# Patient Record
Sex: FEMALE | Race: BLACK OR AFRICAN AMERICAN
[De-identification: names, ages, dates, MRNs, and addresses within clinical notes are randomized per-mention and may not be internally consistent; named-entity substitution may affect disease eponyms.]

---

## 2019-07-15 ENCOUNTER — HOSPITAL ENCOUNTER (EMERGENCY)
Dept: HOSPITAL 88 - ER | Age: 69
Discharge: HOME | End: 2019-07-15
Payer: MEDICARE

## 2019-07-15 VITALS — WEIGHT: 206 LBS | BODY MASS INDEX: 37.91 KG/M2 | HEIGHT: 62 IN

## 2019-07-15 VITALS — SYSTOLIC BLOOD PRESSURE: 152 MMHG | DIASTOLIC BLOOD PRESSURE: 76 MMHG

## 2019-07-15 DIAGNOSIS — I87.2: ICD-10-CM

## 2019-07-15 DIAGNOSIS — M79.604: ICD-10-CM

## 2019-07-15 DIAGNOSIS — R60.9: ICD-10-CM

## 2019-07-15 DIAGNOSIS — M79.605: Primary | ICD-10-CM

## 2019-07-15 DIAGNOSIS — I10: ICD-10-CM

## 2019-07-15 PROCEDURE — 93970 EXTREMITY STUDY: CPT

## 2019-07-15 PROCEDURE — 99283 EMERGENCY DEPT VISIT LOW MDM: CPT

## 2019-07-15 NOTE — XMS REPORT
Patient Summary Document

                             Created on: 07/15/2019



JORGITO MEYERS

External Reference #: [id number]

: 1950

Sex: Female



Demographics







                          Address                   [address]

[place]  [address]

 

                          Home Phone                [phone]

 

                          Preferred Language        Unknown

 

                          Marital Status            Unknown

 

                          Anabaptism Affiliation     Unknown

 

                          Race                      Unknown

 

                          Ethnic Group              Unknown





Author







                          Author                    [facility]

 

                          [facility]

 

                          Address                   Unknown

 

                          Phone                     Unavailable







Care Team Providers







                    Care Team Member Name    Role                Phone

 

                    COSME FORTUNE    Unavailable         Unavailable

 

                    DORYSROBERT    Unavailable         Unavailable







Problems

This patient has no known problems.



Allergies, Adverse Reactions, Alerts

This patient has no known allergies or adverse reactions.



Medications

This patient has no known medications.



Results







           Test Description    Test Time    Test Comments    Text Results    Atomic Results    Result

 Comments

 

                    FL, RAD TECH IN OR/30 MINUTE INCREMENTS    2018 15:12:00    Reason for exam:->Bilateral

 L5-S1 transforaminal epidural steroid injection    FINAL REPORT PATIENT ID:   [id number]

 Fluoroscopic spot imaging was performed at the time of the procedure by the 
ordering service. This examination is nondiagnostic. Fluoroscopy was not 
performed by the undersigned, and the radiologist was not present at the time of
examination. Interpretation of the images was not requested. Total fluoroscopy 
time:  28.7 seconds Total number of films: 2 Please refer to the referring 
physician's procedure report for complete detail. Signed: Faiza Ruiz 
Verified Date/Time:  2018 15:12:50 Reading Location: [address] 
Radiology Reading Room      Electronically signed by: FAIZA RUIZ M.D. on 
2018 03:12 PM                      

 

                    RAD, CHEST, 1 VIEW, NON DEPT    2018 14:54:00    Reason for exam:->LEG SWELLING

                                        FINAL REPORT PATIENT ID:   [id number] EXAM: Frontal chest radiograph HISTORY PROVIDED:

 Leg swelling COMPARISON: 2018 IMPRESSION:The lungs are clear without focal 
consolidation. There may be a trace left pleural effusion given minimal blunting
of the left costophrenic angle. No discernible pneumothorax. The 
cardiomediastinal silhouette is within normal limits. The thoracic aorta is 
tortuous. No acute osseous abnormality. Signed: Cole Castellon 
Verified Date/Time:  2018 14:54:03 Reading Location: [facility] Mammo Reading 
Room      Electronically signed by: COLE CASTELLON on 2018 02:54 PM     

 

 

                TROPONIN I      2018 14:45:00                      

 

   

 

                TROPONIN I (BEAKER) (test code=397)    < ng/mL         0.00-0.03        





Troponin I (TnI) levels must be interpreted in the context of the presenting sym
ptoms and the clinical findings. Elevated TnI levels indicate myocardial damage,
but are not specific for ischemic heart disease. Elevated TnI levels are seen in
patients with other cardiac conditions (including myocarditis and congestive h
eart failure), and slight TnI elevations occur in patients with other conditions
, including sepsis, renal failure, acidosis, acute neurological disease, and per
sistent tachyarrhythmia.B-TYPE NATRIURETIC FACTOR (BNP)2018 14:45:00* 





                Test Item       Value           Reference Range    Comments

 

                B-TYPE NATRIURETIC PEPTIDE (BEAKER) (test code=700)    105 pg/mL       0-100            





[id number] 14:37:00* 





                Test Item       Value           Reference Range    Comments

 

                MAGNESIUM (BEAKER) (test code=627)    2.2 mg/dL       1.6-2.6          





BASIC METABOLIC [id number] 14:37:00* 





                Test Item       Value           Reference Range    Comments

 

                SODIUM (BEAKER) (test code=381)    142 meq/L       136-145          

 

                POTASSIUM (BEAKER) (test code=379)    3.9 meq/L       3.5-5.1          

 

                CHLORIDE (BEAKER) (test code=382)    107 meq/L                  

 

                CO2 (BEAKER) (test code=355)    27 meq/L        22-29            

 

                BLOOD UREA NITROGEN (BEAKER) (test code=354)    13 mg/dL        7-21             

 

                CREATININE (BEAKER) (test code=358)    0.85 mg/dL      0.57-1.25        

 

                GLUCOSE RANDOM (BEAKER) (test code=652)    88 mg/dL                   

 

                CALCIUM (BEAKER) (test code=697)    9.9 mg/dL       8.4-10.2         

 

                EGFR (BEAKER) (test code=1092)    81 mL/min/1.73 sq m                    ESTIMATED GFR IS NOT AS 

ACCURATE AS CREATININE CLEARANCE IN PREDICTING GLOMERULAR FILTRATION RATE. 
ESTIMATED GFR IS NOT APPLICABLE FOR DIALYSIS PATIENTS.





[id number] 14:34:00* 





                Test Item       Value           Reference Range    Comments

 

                PROTIME (BEAKER) (test code=759)    13.9 seconds    11.7-14.7        

 

                INR (BEAKER) (test code=370)    1.1             <=5.9            

 

                PARTIAL THROMBOPLASTIN TIME (BEAKER) (test code=760)    30.9 seconds    22.5-36.0        







RECOMMENDED COUMADIN/WARFARIN INR THERAPY RANGESSTANDARD DOSE: 2.0 - 3.0   Inclu
bita: PROPHYLAXIS for venous thrombosis, systemic embolization; TREATMENT for rj
ous thrombosis and/or pulmonary embolus.HIGH RISK: Target INR is 2.5-3.5 for pat
ients with mechanical heart valves.CBC W/PLT COUNT & AUTO [id number]
14:23:00* 





                Test Item       Value           Reference Range    Comments

 

                WHITE BLOOD CELL COUNT (BEAKER) (test code=775)    6.9 K/ L        3.5-10.5         

 

                RED BLOOD CELL COUNT (BEAKER) (test code=761)    4.29 M/ L       3.93-5.22        

 

                HEMOGLOBIN (BEAKER) (test code=410)    11.8 GM/DL      11.2-15.7        

 

                HEMATOCRIT (BEAKER) (test code=411)    38.1 %          34.1-44.9        

 

                MEAN CORPUSCULAR VOLUME (BEAKER) (test code=753)    88.8 fL         79.4-94.8        

 

                MEAN CORPUSCULAR HEMOGLOBIN (BEAKER) (test code=751)    27.5 pg         25.6-32.2        

 

                    MEAN CORPUSCULAR HEMOGLOBIN CONC (BEAKER) (test code=752)    31.0 GM/DL          32.2-35.5

                                         

 

                RED CELL DISTRIBUTION WIDTH (BEAKER) (test code=412)    13.3 %          11.7-14.4        

 

                PLATELET COUNT (BEAKER) (test code=756)    281 K/CU MM     150-450          

 

                MEAN PLATELET VOLUME (BEAKER) (test code=754)    10.0 fL         9.4-12.3         

 

                NUCLEATED RED BLOOD CELLS (BEAKER) (test code=413)    0 /100 WBC      0-0              

 

                NEUTROPHILS RELATIVE PERCENT (BEAKER) (test code=429)    57 %                             

 

                LYMPHOCYTES RELATIVE PERCENT (BEAKER) (test code=430)    28 %                             

 

                MONOCYTES RELATIVE PERCENT (BEAKER) (test code=431)    13 %                             

 

                EOSINOPHILS RELATIVE PERCENT (BEAKER) (test code=432)    2 %                              

 

                BASOPHILS RELATIVE PERCENT (BEAKER) (test code=437)    1 %                              

 

                NEUTROPHILS ABSOLUTE COUNT (BEAKER) (test code=670)    3.90 K/ L       1.56-6.13        

 

                LYMPHOCYTES ABSOLUTE COUNT (BEAKER) (test code=414)    1.90 K/ L       1.18-3.74        

 

                MONOCYTES ABSOLUTE COUNT (BEAKER) (test code=415)    0.88 K/ L       0.24-0.36        

 

                EOSINOPHILS ABSOLUTE COUNT (BEAKER) (test code=416)    0.14 K/ L       0.04-0.36        

 

                BASOPHILS ABSOLUTE COUNT (BEAKER) (test code=417)    0.07 K/ L       0.01-0.08        

 

                IMMATURE GRANULOCYTES-RELATIVE PERCENT (BEAKER) (test code=2801)    0 %             0-1              





MR, BRAIN WITH IV [id number] 14:05:00FINAL REPORT PATIENT ID:   [id number]
 MRI brain and orbits with and without contrast Comparison:      CTA 2018, MRI September 10 Reason for exam: Multiple sclerosis Discussion: T1 
precontrast and multiplanar T1 postcontrast MR imaging of the brain was 
provided. Reconstructed postcontrast renderings were provided. Dedicated axial 
and coronal pre and postcontrast orbital imaging was performed as well using T1 
and T2 sequences with appropriate use of fat saturation. Postcontrast imaging is
motion degraded. The study is therefore limited for detecting subtle enhancing 
foci. No grossly apparent abnormal cerebral enhancement is identified. Orbital 
imaging revealed no convincing abnormality. Prominent superior ophthalmic veins 
are thought to be a normal variant. The globes, optic nerve/sheath complexes, 
intraconal regions, extraocular musculature, extraconal regions, cavernous 
sinuses, optic chiasm, and parasellar regions are within normal limits. 
Impressions: 1. Motion degraded postcontrast brain evaluation albeit with no 
grossly apparent pathologic enhancement. Consider follow-up imaging as clin
ically warranted.   2. Unremarkable orbits. Signed: [facility]
 Date/Time:  2018 14:05:18 Reading Location: [facility] C013 Neuro Reading 
Room      Electronically signed by: CARLOS POTTER M.D. on 2018 02:05 PM 
MR, ORBIT, FACE, NECK, [id number] 14:05:00Reason for exam:->MS? OPTIC 
NEURITISFINAL REPORT PATIENT ID:   [id number]  MRI brain and orbits with and 
without contrast Comparison:      CTA 2018, MRI September 10 
Reason for exam: Multiple sclerosis Discussion: T1 precontrast and multiplanar 
T1 postcontrast MR imaging of the brain was provided. Reconstructed postcontrast
renderings were provided. Dedicated axial and coronal pre and postcontrast 
orbital imaging was performed as well using T1 and T2 sequences with appropriate
use of fat saturation. Postcontrast imaging is motion degraded. The study is 
therefore limited for detecting subtle enhancing foci. No grossly apparent 
abnormal cerebral enhancement is identified. Orbital imaging revealed no 
convincing abnormality. Prominent superior ophthalmic veins are thought to be a 
normal variant. The globes, optic nerve/sheath complexes, intraconal regions, 
extraocular musculature, extraconal regions, cavernous sinuses, optic chiasm, 
and parasellar regions are within normal limits. Impressions: 1. Motion degraded
postcontrast brain evaluation albeit with no grossly apparent pathologic 
enhancement. Consider follow-up imaging as clinically warranted.   2. 
Unremarkable orbits. Signed: Carlos Potter Verified Date/Time:  
2018 14:05:18 Reading Location: [facility] C0[facility] Neuro Reading Room      
Electronically signed by: CARLOS POTTER M.D. on 2018 02:05 PM MR, BRAIN, 
WITHOUT CONTRAST2018-09-10 17:48:00FINAL REPORT PATIENT ID:   [id number] MRI Brain
without contrast Clinical History: encephalopathy Technique: MRI of the brain 
utilizing axial T2, FLAIR, GRE, DWI; sagittal and coronal T1-weighted images. 
Comparisons: CT 2018 Findings: There is no evidence of acute infarct or 
hemorrhage. There is mild to moderate periventricular and subcortical white 
matter T2 hyperintensity, which is nonspecific but compatible with chronic 
microvascular ischemic change. There is mild generalized parenchymal volume loss
without hydrocephalus, midline shift, or apparent mass effect. There are no 
extra-axial fluid collections. The craniocervical junction is preserved. The 
major intracranial flow-voids appear patent.  IMPRESSION: No evidence of acute 
infarct, hemorrhage, or hydrocephalus. Chronic microvascular ischemic disease 
with generalized parenchymal volume loss. Signed: Choco James 
Verified Date/Time:  09/10/2018 17:48:49 Reading Location: [facility] 
Radiology Reading Room      Electronically signed by: CHOCO JAMES M.D. on 
09/10/2018 05:48 PM NM, PARATHYROID IMAGING WITH TOMOGRAPHIC SPECT2018-09-10 
16:44:00Reason for exam:->primary hyperparathyroidismFINAL REPORT PATIENT ID:   
[id number]  PROCEDURE:     PARATHYROID SCAN, with SPECT CPT CODE:        80190 
INDICATION:      History of primary hyperthyroidism, recent admission for 
uncontrollable hypertension PROTOCOL:      25.3 mCi of Tc-99m sestamibi was 
injected intravenously.  Planar imaging of the head/neck and chest was performed
shortly after tracer injection and was repeated approximately 3 hours later. 
Limited SPECT images were obtained for tracer localization. FINDINGS:         
Early images show tracer uptake in the salivary glands, the thyroid gland, and 
the heart. Delayed images show substantial washout of tracer from the thyroid 
gland. No focal abnormality is noted in the thyroid bed or mediastinum. I
MPRESSION:    Normal radionuclide parathyroid scan.  There is no focal accumulat
ion to indicate parathyroid adenoma. Signed: Apolinar Koroma MDReport Verified Rachid
e/Time:  09/10/2018 16:44:43 Reading Location: [address]    Electronically signed by: APOLINAR KOROMA MD on 09/10/2018 04:44 
PM CALCIUM, 24 HOUR URINE2018-09-10 14:33:00* 





                Test Item       Value           Reference Range    Comments

 

                VOLUME, TOTAL (BEAKER) (test code=1457)    2550 ml                          

 

                CALCIUM URINE (BEAKER) (test code=396)    < mg/dL                          

 

                CALCIUM, 24HR URINE (BEAKER) (test code=1520)    < mg/24 Hr                 





BASIC METABOLIC PANEL2018-09-10 06:49:00* 





                Test Item       Value           Reference Range    Comments

 

                SODIUM (BEAKER) (test code=381)    141 meq/L       135-148          

 

                POTASSIUM (BEAKER) (test code=379)    3.8 meq/L       3.6-5.5          

 

                CHLORIDE (BEAKER) (test code=382)    111 meq/L                  

 

                CO2 (BEAKER) (test code=355)    24 meq/L        20-29            

 

                BLOOD UREA NITROGEN (BEAKER) (test code=354)    14 mg/dL        10-26            

 

                CREATININE (BEAKER) (test code=358)    0.78 mg/dL      0.50-1.20        

 

                GLUCOSE RANDOM (BEAKER) (test code=652)    87 mg/dL                   

 

                CALCIUM (BEAKER) (test code=697)    8.7 mg/dL       8.5-10.5         

 

                EGFR (BEAKER) (test code=1092)    89 mL/min/1.73 sq m                    ESTIMATED GFR IS NOT AS 

ACCURATE AS CREATININE CLEARANCE IN PREDICTING GLOMERULAR FILTRATION RATE. 
ESTIMATED GFR IS NOT APPLICABLE FOR DIALYSIS PATIENTS.





VITAMIN D, [id number] 13:19:00* 





                Test Item       Value           Reference Range    Comments

 

                VITAMIN D 25-OH (BEAKER) (test code=2764)    13.0 ng/mL      6.6-49.9         





Effective 10/11/2017: Reference Range ChangeNew: 6.6-49.9 ng/mL   Previous: 13.0
-47.8 ng/mLRecommended Vitamin D Target Range: 30.0-40.0 ng/mLTROPONIN I
2018 09:11:00* 





                Test Item       Value           Reference Range    Comments

 

                TROPONIN I (BEAKER) (test code=397)    < ng/mL         0.00-0.15        





Troponin I (TnI) levels must be interpreted in the context of the presenting sym
ptoms and the clinical findings. Elevated TnI levels indicate myocardial damage,
but are not specific for ischemic heart disease. Elevated TnI levels are seen in
patients with other cardiac conditions (including myocarditis and congestive h
eart failure), and slight TnI elevations occur in patients with other conditions
, including sepsis, renal failure, acidosis, acute neurological disease, and per
sistent tachyarrhythmia.COMPREHENSIVE METABOLIC [id number] 06:28:00* 





                Test Item       Value           Reference Range    Comments

 

                TOTAL PROTEIN (BEAKER) (test code=770)    7.4 gm/dL       6.0-8.5          

 

                ALBUMIN (BEAKER) (test code=1145)    4.0 g/dL        3.5-5.0          

 

                ALKALINE PHOSPHATASE (BEAKER) (test code=346)    68 U/L                     

 

                BILIRUBIN TOTAL (BEAKER) (test code=377)    0.5 mg/dL       0.1-1.2          

 

                SODIUM (BEAKER) (test code=381)    140 meq/L       135-148          

 

                POTASSIUM (BEAKER) (test code=379)    3.2 meq/L       3.6-5.5          

 

                CHLORIDE (BEAKER) (test code=382)    103 meq/L                  

 

                CO2 (BEAKER) (test code=355)    28 meq/L        20-29            

 

                BLOOD UREA NITROGEN (BEAKER) (test code=354)    13 mg/dL        10-26            

 

                CREATININE (BEAKER) (test code=358)    1.05 mg/dL      0.50-1.20        

 

                GLUCOSE RANDOM (BEAKER) (test code=652)    71 mg/dL                   

 

                CALCIUM (BEAKER) (test code=697)    9.3 mg/dL       8.5-10.5         

 

                AST (SGOT) (BEAKER) (test code=353)    16 U/L          5-40             

 

                ALT (SGPT) (BEAKER) (test code=347)    11 U/L          5-50             

 

                EGFR (BEAKER) (test code=1092)    63 mL/min/1.73 sq m                    ESTIMATED GFR IS NOT AS 

ACCURATE AS CREATININE CLEARANCE IN PREDICTING GLOMERULAR FILTRATION RATE. 
ESTIMATED GFR IS NOT APPLICABLE FOR DIALYSIS PATIENTS.





CALCIUM, [id number] 04:59:00* 





                Test Item       Value           Reference Range    Comments

 

                CALCIUM IONIZED (BEAKER) (test code=698)    1.11 mmol/L     1.12-1.27        

 

                PH, BLOOD (BEAKER) (test code=1810)    7.31                             





TROPONIN -39-73 02:03:00* 





                Test Item       Value           Reference Range    Comments

 

                TROPONIN I (BEAKER) (test code=397)    < ng/mL         0.00-0.15        





Troponin I (TnI) levels must be interpreted in the context of the presenting sym
ptoms and the clinical findings. Elevated TnI levels indicate myocardial damage,
but are not specific for ischemic heart disease. Elevated TnI levels are seen in
patients with other cardiac conditions (including myocarditis and congestive h
eart failure), and slight TnI elevations occur in patients with other conditions
, including sepsis, renal failure, acidosis, acute neurological disease, and per
sistent tachyarrhythmia.TSH/FREE T4 IF [id number] 18:04:00* 





                Test Item       Value           Reference Range    Comments

 

                THYROID STIMULATING HORMONE (BEAKER) (test code=772)    1.65 uIU/mL     0.35-5.50        





PTH, [id number] 18:01:00* 





                Test Item       Value           Reference Range    Comments

 

                PARATHYROID HORMONE INTACT (BEAKER) (test code=577)    94.3 pg/mL      15.0-90.0        





TROPONIN -09-67 08:34:00* 





                Test Item       Value           Reference Range    Comments

 

                TROPONIN I (BEAKER) (test code=397)    < ng/mL         0.00-0.15        





Troponin I (TnI) levels must be interpreted in the context of the presenting sym
ptoms and the clinical findings. Elevated TnI levels indicate myocardial damage,
but are not specific for ischemic heart disease. Elevated TnI levels are seen in
patients with other cardiac conditions (including myocarditis and congestive h
eart failure), and slight TnI elevations occur in patients with other conditions
, including sepsis, renal failure, acidosis, acute neurological disease, and per
sistent tachyarrhythmia.BASIC METABOLIC [id number] 06:15:00* 





                Test Item       Value           Reference Range    Comments

 

                SODIUM (BEAKER) (test code=381)    140 meq/L       135-148          

 

                POTASSIUM (BEAKER) (test code=379)    3.7 meq/L       3.6-5.5          

 

                CHLORIDE (BEAKER) (test code=382)    109 meq/L                  

 

                CO2 (BEAKER) (test code=355)    23 meq/L        20-29            

 

                BLOOD UREA NITROGEN (BEAKER) (test code=354)    10 mg/dL        10-26            

 

                CREATININE (BEAKER) (test code=358)    0.78 mg/dL      0.50-1.20        

 

                GLUCOSE RANDOM (BEAKER) (test code=652)    95 mg/dL                   

 

                CALCIUM (BEAKER) (test code=697)    11.2 mg/dL      8.5-10.5         

 

                EGFR (BEAKER) (test code=1092)    89 mL/min/1.73 sq m                    ESTIMATED GFR IS NOT AS 

ACCURATE AS CREATININE CLEARANCE IN PREDICTING GLOMERULAR FILTRATION RATE. 
ESTIMATED GFR IS NOT APPLICABLE FOR DIALYSIS PATIENTS.





LIPID [id number] 04:42:00* 





                Test Item       Value           Reference Range    Comments

 

                TRIGLYCERIDES (BEAKER) (test code=540)    53 mg/dL                        Specimen markedly hemolyzed



 

                CHOLESTEROL (BEAKER) (test code=631)    156 mg/dL                       Specimen markedly hemolyzed

 

                HDL CHOLESTEROL (BEAKER) (test code=976)    61 mg/dL                         

 

                LDL CHOLESTEROL CALCULATED (BEAKER) (test code=633)    84 mg/dL                         





Triglyceride Reference Range:   Low Risk         <150   Borderline    150-199   
High Risk     200-499   Very High Risk  >=500Cholesterol Reference Range:   Low 
Risk         <200   Borderline    200-239    High Risk        >240HDL 
Cholesterol Reference Range:   Low Risk         >=60   High Risk         <40LDL 
Cholesterol Reference Range:   Optimal          <100   Near Optimal  100-129   
Borderline    130-159   High          160-189   Very High       >=190   
HEMOGLOBIN [id number] 04:24:00* 





                Test Item       Value           Reference Range    Comments

 

                HEMOGLOBIN A1C (BEAKER) (test code=368)    5.4 %           4.3-6.1          





CBC W/PLT COUNT & AUTO [id number] 04:16:00* 





                Test Item       Value           Reference Range    Comments

 

                WHITE BLOOD CELL COUNT (BEAKER) (test code=775)    7.4 K/ L        4.0-10.0         

 

                RED BLOOD CELL COUNT (BEAKER) (test code=761)    4.28 M/ L       4.00-5.00        

 

                HEMOGLOBIN (BEAKER) (test code=410)    11.6 GM/DL      12.0-15.5        

 

                HEMATOCRIT (BEAKER) (test code=411)    38.8 %          36.0-46.0        

 

                MEAN CORPUSCULAR VOLUME (BEAKER) (test code=753)    90.7 fL         82.0-99.0        

 

                MEAN CORPUSCULAR HEMOGLOBIN (BEAKER) (test code=751)    27.1 pg         27.0-33.0        

 

                    MEAN CORPUSCULAR HEMOGLOBIN CONC (BEAKER) (test code=752)    29.9 GM/DL          32.0-36.0

                                         

 

                RED CELL DISTRIBUTION WIDTH (BEAKER) (test code=412)    14.0 %          12.0-15.0        

 

                PLATELET COUNT (BEAKER) (test code=756)    253 K/CU MM     150-430          

 

                MEAN PLATELET VOLUME (BEAKER) (test code=754)    10.1 fL         6.0-11.5         

 

                NUCLEATED RED BLOOD CELLS (BEAKER) (test code=413)    0 /100 WBC      0-0              

 

                NEUTROPHILS RELATIVE PERCENT (BEAKER) (test code=429)    59 %                             

 

                LYMPHOCYTES RELATIVE PERCENT (BEAKER) (test code=430)    27 %                             

 

                MONOCYTES RELATIVE PERCENT (BEAKER) (test code=431)    12 %                             

 

                EOSINOPHILS RELATIVE PERCENT (BEAKER) (test code=432)    1 %                              

 

                BASOPHILS RELATIVE PERCENT (BEAKER) (test code=437)    0 %                              

 

                NEUTROPHILS ABSOLUTE COUNT (BEAKER) (test code=670)    4.37 K/ L       1.80-8.00        

 

                LYMPHOCYTES ABSOLUTE COUNT (BEAKER) (test code=414)    2.04 K/ L       1.48-4.50        

 

                MONOCYTES ABSOLUTE COUNT (BEAKER) (test code=415)    0.91 K/ L       0.00-1.30        

 

                EOSINOPHILS ABSOLUTE COUNT (BEAKER) (test code=416)    0.07 K/ L       0.00-0.50        

 

                BASOPHILS ABSOLUTE COUNT (BEAKER) (test code=417)    0.03 K/ L       0.00-0.20        

 

                IMMATURE GRANULOCYTES-RELATIVE PERCENT (BEAKER) (test code=2801)    0 %             0-0              





CT, CTANGIO  [id number] 22:54:00FINAL REPORT PATIENT ID:   [id number] CT, 
CTANGIO  BRAINBRAIN CT WITHOUT CONTRAST INDICATION: headache, hypertension, 
dizzy COMPARISON: CT head of the same date TECHNIQUE:Rapid acquisition spiral 
images were obtained between the skull base and the cranial vertex during 
intravenous contrast infusion to reconstruct axial images and angiographic 3D 
maximum intensity projections (MIP). 3-D volumetric reformatted images were 
created at a dedicated workstation. Precontrast images of the brain were also 
obtained.  DOSE REDUCTION: Dose modulation, iterative reconstruction, and/or 
weight-based adjustment of the mA/kV was utilized to reduce the radiation dose 
to as low as reasonably achievable. FINDINGS:NECT BRAIN:Cerebral parenchyma: 
Patchy low attenuation noted in the parieto-occipital regions bilaterally as 
well as small focus in the left frontal pole. No disruption of the gray-white 
distinction. No hemorrhage.Midline structures: Normally positioned.Cerebellum 
and brainstem: Normal.Ventricles: Normal volume.Extra-axial spaces: Unrema
rkable.Calvarium and skull base: Intact.Paranasal sinuses and mastoid air cells:
Visible chambers are clear.Orbital contents: Included portions unremarkable. CTA
BRAIN:Internal carotid arteries: Petrous, cavernous and supraclinoid portions 
patent. Middle cerebral arteries: Patent to distal branches.Anterior cerebral ar
teries: Patent. Intact anterior communicating artery.Basilar system: Patent vert
ebrobasilar system.Posterior cerebral arteries:Patent beyond the quadrigeminal s
egments.Venous opacification: Major dural sinuses unremarkable for bolus timing.
Additional findings: None.  IMPRESSION: Parenchymal appearance suggests hyperten
sive (reversible) encephalopathy syndrome. Unremarkable CTA of the head. Signed:
JR Sandoval Robert MDReport Verified Date/Time:  2018 22:54:58 Read
ing Location: [facility] B1 C013Y CT Body Reading Room    Electronically signed by: JETHRO SANDOVAL on 2018 10:54 PM B-TYPE NATRIURETIC FACTOR (BNP)
2018 21:45:00* 





                Test Item       Value           Reference Range    Comments

 

                B-TYPE NATRIURETIC PEPTIDE (BEAKER) (test code=700)    27 pg/mL        0-100            





TROPONIN -[id number] 21:44:00* 





                Test Item       Value           Reference Range    Comments

 

                TROPONIN I (BEAKER) (test code=397)    < ng/mL         0.00-0.15        





Troponin I (TnI) levels must be interpreted in the context of the presenting sym
ptoms and the clinical findings. Elevated TnI levels indicate myocardial damage,
but are not specific for ischemic heart disease. Elevated TnI levels are seen in
patients with other cardiac conditions (including myocarditis and congestive h
eart failure), and slight TnI elevations occur in patients with other conditions
, including sepsis, renal failure, acidosis, acute neurological disease, and per
sistent tachyarrhythmia.BASIC METABOLIC [id number] 21:38:00* 





                Test Item       Value           Reference Range    Comments

 

                SODIUM (BEAKER) (test code=381)    139 meq/L       135-148          

 

                POTASSIUM (BEAKER) (test code=379)    5.4 meq/L       3.6-5.5         Specimen markedly hemolyzed



 

                CHLORIDE (BEAKER) (test code=382)    105 meq/L                  

 

                CO2 (BEAKER) (test code=355)    22 meq/L        20-29            

 

                BLOOD UREA NITROGEN (BEAKER) (test code=354)    13 mg/dL        10-26            

 

                CREATININE (BEAKER) (test code=358)    0.89 mg/dL      0.50-1.20       Specimen markedly hemolyzed



 

                GLUCOSE RANDOM (BEAKER) (test code=652)    96 mg/dL                   

 

                CALCIUM (BEAKER) (test code=697)    11.1 mg/dL      8.5-10.5         

 

                EGFR (BEAKER) (test code=1092)    77 mL/min/1.73 sq m                    ESTIMATED GFR IS NOT AS 

ACCURATE AS CREATININE CLEARANCE IN PREDICTING GLOMERULAR FILTRATION RATE. 
ESTIMATED GFR IS NOT APPLICABLE FOR DIALYSIS PATIENTS.





PT/[id number] 21:33:00* 





                Test Item       Value           Reference Range    Comments

 

                PROTIME (BEAKER) (test code=759)    10.3 sec        9.3-12.0         

 

                INR (BEAKER) (test code=370)    0.9             <=5.9            

 

                PARTIAL THROMBOPLASTIN TIME (BEAKER) (test code=760)    27.5 sec        23.0-35.0        





RECOMMENDED COUMADIN/WARFARIN INR THERAPY RANGESSTANDARD DOSE: 2.0 - 3.0   Inclu
bita: PROPHYLAXIS for venous thrombosis, systemic embolization; TREATMENT for rj
ous thrombosis and/or pulmonary embolus.HIGH RISK: Target INR is 2.5-3.5 for pat
ients with mechanical heart valves.Final Information (Auto Output)Final Informat
ion (Auto Output)Final Information (Auto Output)[id number] 21:31:00* 





                Test Item       Value           Reference Range    Comments

 

                MAGNESIUM (BEAKER) (test code=627)    3.4 mg/dL       1.5-3.0         Specimen markedly hemolyzed







RAD, CHEST, 1 VIEW, NON [id number] 21:30:00Reason for exam:->
HYPERTENSIONReason for exam:->CHEST PAINShould this be performed at the 
bedside?->YesFINAL REPORT PATIENT ID:   [id number]  History: Chest pain. 
Comparison: 2016 Findings: A single view of the chest is submitted. The 
cardiomediastinal contours are unremarkable.  There is no focal consolidation, 
pneumothorax, large pleural effusion or evidence of overt pulmonary edema.   
There is no acute bony abnormality. Impression: No acute abnormality. Signed: 
Sukhi Moss MDReport Verified Date/Time:  2018 21:30:58 Reading 
Location: [address] Reading Room      Electronically signed by: 
SUKHI MOSS M.D. on 2018 09:30 PM CBC W/PLT COUNT & AUTO DIFFERENTIAL
2018 21:06:00* 





                Test Item       Value           Reference Range    Comments

 

                WHITE BLOOD CELL COUNT (BEAKER) (test code=775)    8.6 K/ L        4.0-10.0         

 

                RED BLOOD CELL COUNT (BEAKER) (test code=761)    4.68 M/ L       4.00-5.00        

 

                HEMOGLOBIN (BEAKER) (test code=410)    12.8 GM/DL      12.0-15.5        

 

                HEMATOCRIT (BEAKER) (test code=411)    42.6 %          36.0-46.0        

 

                MEAN CORPUSCULAR VOLUME (BEAKER) (test code=753)    91.0 fL         82.0-99.0        

 

                MEAN CORPUSCULAR HEMOGLOBIN (BEAKER) (test code=751)    27.4 pg         27.0-33.0        

 

                    MEAN CORPUSCULAR HEMOGLOBIN CONC (BEAKER) (test code=752)    30.0 GM/DL          32.0-36.0

                                         

 

                RED CELL DISTRIBUTION WIDTH (BEAKER) (test code=412)    14.6 %          12.0-15.0        

 

                PLATELET COUNT (BEAKER) (test code=756)    283 K/CU MM     150-430          

 

                MEAN PLATELET VOLUME (BEAKER) (test code=754)    10.4 fL         6.0-11.5         

 

                NUCLEATED RED BLOOD CELLS (BEAKER) (test code=413)    0 /100 WBC      0-0              

 

                NEUTROPHILS RELATIVE PERCENT (BEAKER) (test code=429)    65 %                             

 

                LYMPHOCYTES RELATIVE PERCENT (BEAKER) (test code=430)    25 %                             

 

                MONOCYTES RELATIVE PERCENT (BEAKER) (test code=431)    9 %                              

 

                EOSINOPHILS RELATIVE PERCENT (BEAKER) (test code=432)    0 %                              

 

                BASOPHILS RELATIVE PERCENT (BEAKER) (test code=437)    1 %                              

 

                NEUTROPHILS ABSOLUTE COUNT (BEAKER) (test code=670)    5.53 K/ L       1.80-8.00        

 

                LYMPHOCYTES ABSOLUTE COUNT (BEAKER) (test code=414)    2.13 K/ L       1.48-4.50        

 

                MONOCYTES ABSOLUTE COUNT (BEAKER) (test code=415)    0.80 K/ L       0.00-1.30        

 

                EOSINOPHILS ABSOLUTE COUNT (BEAKER) (test code=416)    0.03 K/ L       0.00-0.50        

 

                BASOPHILS ABSOLUTE COUNT (BEAKER) (test code=417)    0.06 K/ L       0.00-0.20        

 

                IMMATURE GRANULOCYTES-RELATIVE PERCENT (BEAKER) (test code=2801)    0 %             0-0              





FL, RAD TECH IN OR/30 MINUTE [id number] 15:39:00Reason for exam:->
intrforamanal injectionFINAL REPORT PATIENT ID:   [id number] History: 
Intraforaminal injection COMPARISON: None DISCUSSION: A total of 2 
intraoperative images were submitted for interpretation. Neither was a 
radiologist present nor requested during the procedure. The findings should be 
correlated with intraprocedural observations. The images were presented for 
interpretation following completion of the examination. This is a nondiagnostic 
examination. The total fluoroscopy time was 43.2 seconds . A total of 2 static 
images were acquired. Signed: Aarti Hernandez Verified Date/Time:  
2018 15:39:02 Reading Location: [address] Radiology Reading Room      
Electronically signed by: AARTI HERNANDEZ M.D. on 2018 03:39 PM

## 2019-07-15 NOTE — XMS REPORT
Clinical Summary

                             Created on: 07/15/2019



Jorgito Cortez

External Reference #: [id number]

: 1950

Sex: Female



Demographics







                          Address                   [address] 

[place]  [address]

 

                          Home Phone                [phone]

 

                          Preferred Language        English

 

                          Marital Status            Unknown

 

                          Oriental orthodox Affiliation     Zoroastrian

 

                          Race                      Black or 

 

                          Ethnic Group              Non-





Author







                          Author                    [facility]

 

                          Organization              [facility]

 

                          Address                   Unknown

 

                          Phone                     Unavailable







Support







                Name            Relationship    Address         Phone

 

                    Eliel Singh       [employer]                [address] [place]  [address]                [phone]

 

                    Ponce Cortez Jr    EC[employer]                [address] [place]  [address]                [phone]







Care Team Providers







                    Care Team Member Name    Role                Phone

 

                    Allen Maki MD    PCP                 Unavailable







Allergies







                                        Comments



                 Active Allergy     Reactions       Severity        Noted Date 

 

                                         



                 Ciprofloxacin     Swelling        Low             10/07/2014 

 

                                         



                 Meperidine      Itching,        Low             10/07/2014 



                                         Swelling   

 

                                         



                 Erythromycin     Swelling        Low             2015 

 

                                        



Pt not aware of this allergy.



                           Erythropoietin Analogues       10/07/2014 

 

                                        



Fever.



                 Pneumococcal Vaccine     Swelling,       Medium          10/07/2014 



                                         Other (See   



                                         Comments)   

 

                                         



                 Butorphanol Tartrate     Itching,        Low             10/07/2014 



                                         Swelling   

 

                                         



                 Pentazocine Lactate     Itching,        Low             10/07/2014 



                                         Swelling   







Medications







                          End Date                  Status



              Medication     Sig          Dispensed     Refills      Start  



                                         Date  

 

                                                    Active



                     traMADol (ULTRAM) 50 mg     Take 50 mg by       0   



                           tablet                    mouth every 6     



                                         (six) hours     



                                         as needed for     



                                         Pain.     

 

                                                    Active



                     gabapentin (NEURONTIN)     Take 600 mg         0   



                           600 MG tablet             by mouth 4     



                                         (four) times     



                                         daily .     

 

                                                    Active



                     diazepam (VALIUM) 10 MG     Take 5 mg by        0   



                           tablet                    mouth as     



                                         needed for     



                                         Anxiety .     

 

                                                    Active



                     baclofen (LIORESAL) 10 MG     Take 10 mg by       0   



                           tablet                    mouth as     



                                         needed .     

 

                                                    Active



                     amitriptyline (ELAVIL)     Take 100 mg         0   



                           100 MG tablet             by mouth     



                                         every night     



                                         as needed for     



                                         Sleep.     

 

                                                    Active



                     loratadine (CLARITIN) 10     Take 10 mg by       0   



                           mg tablet                 mouth daily.     

 

                                                    Active



                     oxybutynin (DITROPAN-XL)     Take 5 mg by        0   



                           5 MG 24 hr tablet         mouth daily .     

 

                                                    Active



                     azelastine (ASTELIN) 137     2 sprays by         0   



                           mcg (0.1 %) nasal spray     Nasal route     



                                         daily Use in     



                                         each nostril     



                                         as directed .     

 

                                                    Active



                     budesonide-formoterol     Inhale 2            0   



                           (SYMBICORT) 80-4.5        puffs by     



                           mcg/actuation inhaler     mouth via     



                                         inhaler as     



                                         needed .     

 

                                                    Active



                     levalbuterol (XOPENEX     Inhale 1 puff       0   



                           HFA) 45 mcg/actuation     by mouth via     



                           inhaler                   inhaler as     



                                         needed for     



                                         Wheezing .     

 

                                                    Active



                     SUMAtriptan (IMITREX) 25     Take 25 mg by       0   



                           MG tablet                 mouth every 2     



                                         (two) hours     



                                         as needed for     



                                         Migraine.     

 

                                                    Active



                     pantoprazole (PROTONIX)     Take 40 mg by       0   



                           40 MG tablet              mouth daily.     

 

                                                    Active



                     interferon beta-1a     Inject              0   



                           (AVONEX) 30 mcg/0.5 mL     intramuscular     



                           PnIj                      ly once a     



                                         week.     

 

                                                    Active



                     aspirin 81 MG EC tablet     Take 81 mg by       0   



                                         mouth daily.     

 

                                                    Active



                     ipratropium (ATROVENT)     Take 500 mcg        0   



                           0.02 % nebulizer solution     by     



                                         nebulization     



                                         as needed for     



                                         Wheezing.     

 

                                                    Active



                     carvedilol (COREG) 12.5     Take 12.5 mg        0   



                           MG tablet                 by mouth 2     



                                         (two) times     



                                         daily with     



                                         breakfast and     



                                         dinner.     

 

                                                    Active



                     losartan-hydroCHLOROthiaz     Take 1 tablet       0   



                           goyo (HYZAAR) 50-12.5 mg     by mouth     



                           per tablet                daily.     

 

                          2018                Discontinued



                     metoprolol (LOPRESSOR) 25     Take 25 mg by       0   



                           MG tablet                 mouth 2 (two)     



                                         times daily.     

 

                          2018                Discontinued



                     losartan (COZAAR) 50 MG     Take 100 mg         0   



                           tablet                    by mouth     



                                         daily .     

 

                          2018                Discontinued



                     levalbuterol (XOPENEX)     Take 1 ampule       0   



                           0.31 mg/3 mL nebulizer     by     



                           solution                  nebulization     



                                         every 4     



                                         (four) hours     



                                         as needed for     



                                         Wheezing.     

 

                          10/12/2018                



              losartan (COZAAR) 100 MG     Take 1 tablet     30 tablet     0              



                     tablet              (100 mg             8  



                                         total) by     



                                         mouth daily     



                                         for 30 days.     

 

                          2018                Discontinued



              cyclobenzaprine     Take 1 tablet     10 tablet     0              



                     (FLEXERIL) 10 MG tablet     (10 mg total)       8  



                                         by mouth 2     



                                         (two) times     



                                         daily as     



                                         needed for up     



                                         to 10 doses.     







Active Problems







 



                           Problem                   Noted Date

 

 



                           Hypertensive urgency      2018

 

 



                           Acute chest pain          2018

 

 



                           Arthritis of right knee     2015

 

 



                           Knee joint replacement status     2015







Encounters







                          Care Team                 Description



                     Date                Type                Specialty  

 

                                        



Dylan Mendoza MD                  



                           2018                Anesthesia   



                                         Event   

 

                                        



Shilo Fowler MD                   INJECTION,EPIDURAL STEROID LUMBAR TRANSFORAMINAL



                           2018                Surgery   

 

                                        



Shilo Fowler MD                    



                           2018                Hospital   



                                         Encounter   

 

                                        



[facility]            



                     2018          Hospital            Pre-Admission Testing  



                                         Encounter   

 

                                        



Ziyad Yancey MD               Bilateral leg pain (Primary Dx); 

Essential hypertension; 

Obesity, unspecified classification, unspecified obesity type, unspecified 
whether serious comorbidity present



                     2018          Emergency           Emergency Medicine  

 

                                                     



                     2018          Orders Only         General Internal Medicine  

 

                                        



Bandar Escalante DO Maredia, Mustaq Kasam, MD               Acute chest pain (Primary Dx); 

Tension headache; 

Hypertensive urgency; 

Obesity (BMI 30-39.9)



                     2018          Hospital            General Internal Medicine  



                           -                         Encounter   



                                         2018    



after 2018



Social History







                                        Date



                 Tobacco Use     Types           Packs/Day       Years Used 

 

                                        Quit: 1986



                     Former Smoker       2                   10 

 

    



                                         Smokeless Tobacco: Never   



                                         Used   









                                        Tobacco Cessation: Counseling Given: No

Comments: QUIT 









   



                 Alcohol Use     Drinks/Week     oz/Week         Comments

 

   



                           Yes                       "very seldom"









 



                           Sex Assigned at Birth     Date Recorded

 

 



                                         Not on file 









                                        Industry



                           Job Start Date            Occupation 

 

                                        Not on file



                           Not on file               Not on file 









                                        Travel End



                           Travel History            Travel Start 

 





                                         No recent travel history available.







Last Filed Vital Signs







                                        Time Taken



                           Vital Sign                Reading 

 

                                        2018  1:59 PM CST



                           Blood Pressure            121/89 

 

                                        2018  1:59 PM CST



                           Pulse                     64 

 

                                        2018  1:59 PM CST



                           Temperature               36.7 C (98 F) 

 

                                        2018  1:59 PM CST



                           Respiratory Rate          14 

 

                                        2018  1:59 PM CST



                           Oxygen Saturation         98% 

 

                                        2018  9:38 AM CDT



                           Inhaled Oxygen            21% 



                                         Concentration  

 

                                        2018 10:39 AM CST



                           Weight                    92.4 kg (203 lb 9.6 oz) 

 

                                        2018 10:39 AM CST



                           Height                    162.6 cm (5' 4") 

 

                                        2018 10:39 AM CST



                           Body Mass Index           34.95 







Plan of Treatment





Not on file



Implants







                    Device Identifier    Shelf Expiration Date    Model / Serial / Lot



                 Implanted       Type            Area            Manufactur   



                                         er   

 

                                        2017          6194-1-001 /

 /

                                        [id number]



                 Cement,Bone Simplex Hv Full Dose     Cement/Joel      Right: Knee     Jg   



                     (Each) - [id number]     ler/Adhesi          Orthopaedi   



                     Implanted: Qty: 1 on 2015 by     Alayna Leivaerine [place], MD      

 

                                        2025          [id number] /

 /

                                        [id number]



                 Tibial Tray,[place] Partial Knee     Joints          Right: Knee     [employer]   



                                         Medial Left Std Sz B - [id number]      



                                         Implanted: Qty: 1 on 2015 by      



                                         Raisa Bryant MD      

 

                                        10/22/2025          [id number] /

 /

                                        [id number]



                 Fem Comp,Screven Partial Knee Uni     Joints          Right: Knee     [employer]   



                                         Cocr Small - [id number]      



                                         Implanted: Qty: 1 on 2015 by      



                                         Raisa Bryant MD      

 

                                        10/06/2020          [id number] /

 /

                                        [id number]



                 Tibial Bearing,Meniscal Screven     Joints          Right: Knee     [employer]   



                           Anatomic Small Right Sz 4 -       .C.   



                                         [id number]      



                                         Implanted: Qty: 1 on 2015 by      



                                         Raisa Bryant MD      







Procedures







                                        Comments



                 Procedure Name     Priority        Date/Time       Associated Diagnosis 

 

                                        



Results for this procedure are in the results section.



                     FL RAD TECH IN OR 30     Routine             2018  



                           MINUTE INCREMENTS         1:38 PM CST  

 

                                         



                     PROCEDURE W/ C-ARM      2018          Osteoarthritis of spine 



                           1:00 PM CST               with radiculopathy, 



                                         lumbar region 

 

  



                                         Special



                                         Needs



                                         (C-ARM)

 

                                         



                     INJECTION,EPIDURAL      2018          Osteoarthritis of spine 



                     STEROID LUMBAR      1:00 PM CST         with radiculopathy, 



                           TRANSFORAMINAL            lumbar region 

 

  



                                         Special



                                         Needs



                                         (C-ARM)

 

                                        



Results for this procedure are in the results section.



                     ED ECG INTERPRETATION     Routine             2018  



                                         11:38 PM CDT  

 

                                        



Results for this procedure are in the results section.



                     VENOUS DOPPLER LEG, LEFT     STAT                2018  



                                         5:50 PM CDT  

 

                                         



                     ECG 12-LEAD         Routine             2018  



                                         2:52 PM CDT  

 

  



                                         Procedure



                                         Note -



                                         Interface,



                                         External



                                         Ris In -



                                         2018



                                         5:19 PM



                                         CDT



                                         Ventricula



                                         r Rate 63



                                         BPM



                                         Atrial



                                         Rate 63



                                         BPM



                                         P-R



                                         Interval



                                         176 ms



                                         QRS



                                         Duration



                                         88 ms



                                         Q-T



                                         Interval



                                         416 ms



                                         QTC



                                         Calculatio



                                         n(Bazett)



                                         425 ms



                                         P Axis 70



                                         degrees



                                         R Axis 38



                                         degrees



                                         T Axis 34



                                         degrees





                                         Normal



                                         sinus



                                         rhythm



                                         Normal ECG



                                         When



                                         compared



                                         with ECG



                                         of



                                         



                                         8 12:06,



                                         No



                                         significan



                                         t change



                                         was found

 

                                        



Results for this procedure are in the results section.



                     ECG 12-LEAD         STAT                2018  



                                         2:52 PM CDT  

 

                                        



Results for this procedure are in the results section.



                     CBC W/PLT COUNT & AUTO     STAT                2018  



                           DIFFERENTIAL              2:12 PM CDT  

 

                                        



Results for this procedure are in the results section.



                     B-TYPE NATRIURETIC FACTOR     STAT                2018  



                           (BNP)                     2:12 PM CDT  

 

                                        



Results for this procedure are in the results section.



                     PT/APTT             STAT                2018  



                                         2:12 PM CDT  

 

                                        



Results for this procedure are in the results section.



                     CBC W/PLT COUNT & AUTO     STAT                2018  



                           DIFFERENTIAL              2:12 PM CDT  

 

                                        



Results for this procedure are in the results section.



                     TROPONIN I          STAT                2018  



                                         2:12 PM CDT  

 

                                        



Results for this procedure are in the results section.



                     MAGNESIUM           STAT                2018  



                                         2:12 PM CDT  

 

                                        



Results for this procedure are in the results section.



                     BASIC METABOLIC PANEL (7)     STAT                2018  



                                         2:12 PM CDT  

 

                                        



Results for this procedure are in the results section.



                     XR CHEST 1 VIEW     STAT                2018  



                           PORTABLE/BEDSIDE          2:06 PM CDT  

 

                                         



                           RHYTHM STRIP - SCAN       2018  



                                         3:31 PM CDT  

 

                                         



                           REPORT OF PROCEDURE -      2018  



                           ENDOSCOPY SCAN            3:30 PM CDT  

 

                                        



Results for this procedure are in the results section.



                     MR ORBIT FACE NECK W & WO     STAT                2018  



                           CONTRAST                  12:09 PM CDT  

 

                                        



Results for this procedure are in the results section.



                     MR BRAIN WITH IV CONTRAST     STAT                2018  



                                         12:09 PM CDT  

 

                                        



Results for this procedure are in the results section.



                     MR BRAIN WITHOUT IV     Routine             09/10/2018  



                           CONTRAST                  4:34 PM CDT  

 

                                        



Results for this procedure are in the results section.



                     NM PARATHYROID IMAGING     Routine             09/10/2018  



                           WITH TOMOGRAPHIC SPECT      3:49 PM CDT  

 

                                        



Results for this procedure are in the results section.



                     BASIC METABOLIC PANEL (7)     Routine             09/10/2018  



                                         5:59 AM CDT  

 

                                        



Results for this procedure are in the results section.



                     CALCIUM, 24 HOUR URINE     Routine             2018  



                                         10:28 PM CDT  

 

                                         



                           ECHOCARDIOGRAM REPORT -      2018  



                           SCAN                      12:20 PM CDT  

 

                                        



Results for this procedure are in the results section.



                     2D ECHO W/ DOPPLER     Routine             2018  



                           (CW/PW/COLOR)             9:26 AM CDT  

 

                                        



Results for this procedure are in the results section.



                     TROPONIN I          STAT                2018  



                                         8:39 AM CDT  

 

                                        



Results for this procedure are in the results section.



                     VITAMIN D, 25-HYDROXY     Routine             2018  



                                         3:46 AM CDT  

 

                                        



Results for this procedure are in the results section.



                     COMPREHENSIVE METABOLIC     Routine             2018  



                           PANEL                     3:46 AM CDT  

 

                                        



Results for this procedure are in the results section.



                     CALCIUM, IONIZED     Routine             2018  



                                         3:46 AM CDT  

 

                                        



Results for this procedure are in the results section.



                     TROPONIN I          STAT                2018  



                                         1:16 AM CDT  

 

                                        



Results for this procedure are in the results section.



                     TSH/FREE T4 IF INDICATED     Routine             2018  



                                         5:16 PM CDT  

 

                                        



Results for this procedure are in the results section.



                     PTH, INTACT         Routine             2018  



                                         5:16 PM CDT  

 

                                        



Results for this procedure are in the results section.



                     TROPONIN I          STAT                2018  



                                         7:56 AM CDT  

 

                                        



Results for this procedure are in the results section.



                     BASIC METABOLIC PANEL (7)     STAT                2018  



                                         5:31 AM CDT  

 

                                        



Results for this procedure are in the results section.



                     CBC W/PLT COUNT & AUTO     STAT                2018  



                           DIFFERENTIAL              4:07 AM CDT  

 

                                        



Results for this procedure are in the results section.



                     CBC W/PLT COUNT & AUTO     STAT                2018  



                           DIFFERENTIAL              4:07 AM CDT  

 

                                        



Results for this procedure are in the results section.



                     LIPID PANEL         STAT                2018  



                                         4:07 AM CDT  

 

                                        



Results for this procedure are in the results section.



                     HEMOGLOBIN A1C      STAT                2018  



                                         4:07 AM CDT  

 

                                        



Results for this procedure are in the results section.



                     ED ECG INTERPRETATION     Routine             2018  



                                         3:28 AM CDT  

 

                                        



Results for this procedure are in the results section.



                     CT/CTA BRAIN        STAT                2018  



                                         10:15 PM CDT  

 

                                        



Results for this procedure are in the results section.



                     XR CHEST 1 VIEW     STAT                2018  



                           PORTABLE/BEDSIDE          9:19 PM CDT  

 

                                        



Results for this procedure are in the results section.



                     CBC W/PLT COUNT & AUTO     STAT                2018  



                           DIFFERENTIAL              9:00 PM CDT  

 

                                        



Results for this procedure are in the results section.



                     B-TYPE NATRIURETIC FACTOR     STAT                2018  



                           (BNP)                     9:00 PM CDT  

 

                                        



Results for this procedure are in the results section.



                     PT/APTT             STAT                2018  



                                         9:00 PM CDT  

 

                                        



Results for this procedure are in the results section.



                     CBC W/PLT COUNT & AUTO     STAT                2018  



                           DIFFERENTIAL              9:00 PM CDT  

 

                                        



Results for this procedure are in the results section.



                     TROPONIN I          STAT                2018  



                                         9:00 PM CDT  

 

                                        



Results for this procedure are in the results section.



                     MAGNESIUM           STAT                2018  



                                         9:00 PM CDT  

 

                                        



Results for this procedure are in the results section.



                     BASIC METABOLIC PANEL (7)     STAT                2018  



                                         9:00 PM CDT  



after 2018



Results

* [place] Rad Tech in OR 30 minute increments (2018  1:38 PM CST)









                                         Specimen

 











 



                           Narrative                 Performed At

 

 



                           FINAL REPORT              [facility]



                                         PATIENT ID: [id number] 



                                         Fluoroscopic spot imaging was performed at the time of the procedure 



                                         by the ordering service. This examination is nondiagnostic. 



                                         Fluoroscopy was not performed by the undersigned, and the radiologist 



                                         was not present at the time of examination. Interpretation of the 



                                         images was not requested. 



                                         Total fluoroscopy time:28.7 seconds 



                                         Total number of films: 2 



                                         Please refer to the referring physician's procedure report for 



                                         complete detail. 



                                         Signed: Faiza Ruiz MD 



                                         Report Verified Date/Time:2018 15:12:50 



                                         Reading Location: [address] Radiology Reading Room 



                                         Electronically signed by: FAIZA RUIZ M.D. on 2018 03:12 PM

 









                                        Procedure Note

 

                                        



Interface, External Ris In - 2018  3:15 PM CST



FINAL REPORT

 

PATIENT ID:   [id number]

 

Fluoroscopic spot imaging was performed at the time of the procedure 

by the ordering service. This examination is nondiagnostic. 

Fluoroscopy was not performed by the undersigned, and the radiologist 

was not present at the time of examination. Interpretation of the 

images was not requested.

 

Total fluoroscopy time:  28.7 seconds

 

Total number of films: 2

 

Please refer to the referring physician's procedure report for 

complete detail.

 

Signed: Faiza Ruiz MD

Report Verified Date/Time:  2018 15:12:50

 

Reading Location: [address] Radiology Reading Room

      Electronically signed by: FAIZA RUIZ M.D. on 2018 03:12 PM

 









   



                 Performing Organization     Address         City/State/Zipcode     Phone Number

 

   



                                          [place]   





* ED ECG Interpretation (2018 11:38 PM CDT)



Only the most recent of 2 results within the time period is included.





 



                           Narrative                 Performed At

 

 



                                         CortezZiyad MD 2018 11:38 PM 



                                         ECG/EKG Interpretation 



                                         Date/Time: 2018 2:52 PM 



                                         Performed by: ZIYAD YANCEY 



                                         Authorized by: ZIYAD YANCEY 



                                         The ECG was interpreted by ED physician. The ECG is interpreted as sinus 



                                         rhythm. Rate is normal rate. Heart rate is 63 BPM. 



                                         Conduction: conduction normal. ST segments normal. T waves normal. Axis is 



                                         normal. 



                                         Clinical Impression: abnormal ECGECG reviewed and does not meet STEMI 



                                         criteria. Patient tolerance: Patient tolerated the procedure well with no 



                                         immediate complications 





* Venous doppler leg, left (2018  5:50 PM CDT)





   



                 Component       Value           Ref Range       Performed At

 

   



                           Ejection Fraction         [facility]



                                         [facility]













                                         Specimen

 











 



                           Impressions               Performed At

 

 



                           Left Impression           [facility]



                           1. There is no deep venous obstruction in the common femoral, profunda     [facility]



                                         femoral, femoral, popliteal, posterior tibial or peroneal veins. 



                                         2. There is no superficial venous obstruction in the great saphenous vein. 



                                         Conclusions 



                                         Summary 



                                         Venous duplex imaging and compression of the left lower extremity were 



                                         performed. The veins were adequately visualized. The left venous system 



                                         was patent and compressible with no evidence of thrombus. The venous 



                                         Doppler waveforms were phasic with respiration . 



                                         Signature 



                                         ---------------------------------------------------------------- 



                                         Electronically signed by GABO Messina MD, 



                                         RPVI(Interpreting physician) on 2018 07:04 PM 



                                         ---------------------------------------------------------------- 



                                         Velocities are measured in cm/s ; Diameters are measured in cm 









 



                           Narrative                 Performed At

 

 



                           PV LAB - Lower Extremities DVT Study     [facility]



                           Demographics              [facility]



                                         Patient Name JORGITO CORTEZ Date of Study2018 



                                         SANCHEZ 



                                         [id number] 



                                         Age66 



                                         Visit Number [id number] Gender 



                                         Female 



                                         Accession Number [id number] Date of Birth1950 



                                         [facility]Ed [facility] [place]Room NumberED8 



                                         Physician 



                                         SonographerMarkus HodgesInterpreting GABO Messina, 



                                         Shani CARBAJAL, PIERO 



                                         Procedure 



                                         Type of Study: 



                                         Veins: Lower Extremities DVT Study, VENOUS DOPPLER LEG, LEFT. 



                                         Indications for Study:Leg swelling. 



                                         Patient Status:STAT. 



                                         Study Location:Portable. 



                                         Technical Quality:Adequate visualization. 



                                         Risk Factors 



                                         History of Disease 



                                         +---------+----+-----------------------------------------------------------+ 



                                         !Diagnosis!Date!Comments

 



                                          ! 



                                         +---------+----+-----------------------------------------------------------+ 



                                         !Other!!Anxiety, Arthritis, Asthma, CVA, MS, Morbid 



                                         Obesity! 



                                         +---------+----+-----------------------------------------------------------+ 









                                        Procedure Note

 

                                        



Interface, External Ris In - 2018  7:09 PM CDT



PV LAB - Lower Extremities DVT Study



 Demographics

 

 Patient Name     JORGITO CORTEZ   Date of Study      2018

                  SANCHEZ

 

 MRN              [id number]         Age                67

 

 Visit Number     [id number]       Gender             Female

 

 Accession Number [id number]         Date of Birth      1950

 

 Referring        [facility]  Room Number        ED8

 Physician

 

 Sonographer      Markus Hodges  Interpreting       GABO Messina,

                  T              Physician          MD, PIERO

 

Procedure

Type of Study:

 

 Veins: Lower Extremities DVT Study, VENOUS DOPPLER LEG, LEFT.

 

Indications for Study:Leg swelling.



Patient Status:STAT.

Study Location:Portable.

Technical Quality:Adequate visualization.



Risk Factors

History of Disease

                                        +---------+----+-----------------------------------------------------------+

!Diagnosis!Date!Comments                                                   !

                                        +---------+----+-----------------------------------------------------------+

!Other    !    !Anxiety, Arthritis, Asthma, CVA, MS, Morbid Obesity        !

                                        +---------+----+-----------------------------------------------------------+



Impressions



Left Impression

                                        1. There is no deep venous obstruction in the common femoral, profunda

femoral, femoral, popliteal, posterior tibial or peroneal veins.

                                        2. There is no superficial venous obstruction in the great saphenous vein.



 Conclusions

 

 Summary

 

 Venous duplex imaging and compression of the left lower extremity were

 performed. The veins were adequately visualized. The left venous system

 was patent and compressible with no evidence of thrombus. The venous

 Doppler waveforms were phasic with respiration .

 

 Signature

 

 ----------------------------------------------------------------

 Electronically signed by GABO Messina MD,

 PIERO(Interpreting physician) on 2018 07:04 PM

 ----------------------------------------------------------------

 

Velocities are measured in cm/s ; Diameters are measured in cm











   



                 Performing Organization     Address         City/State/[facility]     Phone Number

 

   



                                         [facility]   



                                         [facility]   





* ECG 12 lead (2018  2:52 PM CDT)









                                         Specimen

 











 



                           Narrative                 Performed At

 

 



                           Ventricular Rate 63 BPM     [facility]



                                         Atrial Rate 63 BPM 



                                         P-R Interval 176 ms 



                                         QRS Duration 88 ms 



                                         Q-T Interval 416 ms 



                                         QTC Calculation(Bazett) 425 ms 



                                         P Axis 70 degrees 



                                         R Axis 38 degrees 



                                         T Axis 34 degrees 



                                         Normal sinus rhythm 



                                         Normal ECG 



                                         When compared with ECG of 2018 12:06, 



                                         No significant change was found 



                                         Confirmed by MD ANTHONY JOSEPH P ([id number]) on 2018 6:16:35 AM 









                                        Procedure Note

 

                                        



Interface, External Ris In - 2018  6:16 AM CDT



Ventricular Rate 63 BPM

Atrial Rate 63 BPM

P-R Interval 176 ms

QRS Duration 88 ms

Q-T Interval 416 ms

QTC Calculation(Bazett) 425 ms

P Axis 70 degrees

R Axis 38 degrees

T Axis 34 degrees



Normal sinus rhythm

Normal ECG

When compared with ECG of 2018 12:06,

No significant change was found

Confirmed by MD ANTHONY JOSEPH P ([id number]) on 2018 6:16:35 AM









   



                 Performing Organization     Address         City/[facility]     Phone Number

 

   



                                         [facility]   





* PT/PTT (2018  2:12 PM CDT)



Only the most recent of 2 results within the time period is included.





   



                 Component       Value           Ref Range       Performed At

 

   



                 Protime         13.9            11.7 - 14.7 seconds     [facility]

 

   



                 INR             1.1             <=5.9           [facility]

 

   



                 PTT             30.9            22.5 - 36.0 seconds     [facility]













                                         Specimen

 





                                         Blood









 



                           Narrative                 Performed At

 

 



                           RECOMMENDED COUMADIN/WARFARIN INR THERAPY RANGES     [facility]



                           STANDARD DOSE: 2.0 - 3.0 Includes: PROPHYLAXIS for venous thrombosis,     [facility]



                                         systemic embolization; TREATMENT for venous thrombosis and/or pulmonary embolus.

 



                                         HIGH RISK: Target INR is 2.5-3.5 for patients with mechanical heart valves. 









   



                 Performing Organization     Address         City/State/Zipcode     Phone Number

 

   



                 [facility]     [address] [place] [address] [phone]





                                         MEDICAL CENTER   





* CBC with platelet count + automated diff (2018  2:12 PM CDT)



Only the most recent of 3 results within the time period is included.





   



                 Component       Value           Ref Range       Performed At

 

   



                 WBC             6.9             3.5 - 10.5 K/L     [facility]

 

   



                 RBC             4.29            3.93 - 5.22 M/L     [facility]

 

   



                 Hemoglobin      11.8            11.2 - 15.7 GM/DL     [facility]

 

   



                 Hematocrit      38.1            34.1 - 44.9 %     [facility]

 

   



                 MCV             88.8            79.4 - 94.8 fL     [facility]

 

   



                 MCH             27.5            25.6 - 32.2 pg     [facility]

 

   



                 MCHC            31.0 (L)        32.2 - 35.5 GM/DL     [facility]

 

   



                 RDW             13.3            11.7 - 14.4 %     [facility]

 

   



                 Platelets       281             150 - 450 K/CU MM     [facility]

 

   



                 MPV             10.0            9.4 - 12.3 fL     [facility]

 

   



                 nRBC            0               0 - 0 /100 WBC     [facility]

 

   



                 % Neutros       57              %               [facility]

 

   



                 % Lymphs        28              %               [facility]

 

   



                 % Monos         13              %               [facility]

 

   



                 % Eos           2               %               [facility]

 

   



                 % Baso          1               %               [facility]

 

   



                 # Neutros       3.90            1.56 - 6.13 K/L     [facility]

 

   



                 # Lymphs        1.90            1.18 - 3.74 K/L     [facility]

 

   



                 # Monos         0.88 (H)        0.24 - 0.36 K/L     [facility]

 

   



                 # Eos           0.14            0.04 - 0.36 K/L     [facility] [facility]

 

   



                 # Baso          0.07            0.01 - 0.08 K/L     [facility]

 

   



                 Immature        0               0 - 1 %         [facility]



                           Granulocytes-Relative       [facility]













                                         Specimen

 





                                         Blood









   



                 Performing Organization     Address         [facility]     Phone Number

 

   



                 [place] [address]     [phone]   





* Troponin I (2018  2:12 PM CDT)



Only the most recent of 5 results within the time period is included.





   



                 Component       Value           Ref Range       Performed At

 

   



                 Troponin I      <0.01           0.00 - 0.03 ng/mL     [facility]













                                         Specimen

 





                                         Blood









 



                           Narrative                 Performed At

 

 



                           Troponin I (TnI) levels must be interpreted in the context of the presenting     

[facility]



                           symptoms and the clinical findings. Elevated TnI levels indicate myocardial     [facility]



                                         damage, but are not specific for ischemic heart disease. Elevated TnI levels are

 



                                         seen in patients with other cardiac conditions (including myocarditis and 



                                         congestive heart failure), and slight TnI elevations occur in patients with 



                                         other conditions, including sepsis, renal failure, acidosis, acute neurological

 



                                         disease, and persistent tachyarrhythmia. 









   



                 Performing Organization     Address         City/[facility]     Phone Number

 

   



                 [address]   





* B-type Natriuretic Factor (BNP) (2018  2:12 PM CDT)



Only the most recent of 2 results within the time period is included.





   



                 Component       Value           Ref Range       Performed At

 

   



                 BNP             105 (H)         0 - 100 pg/mL     [facility]













                                         Specimen

 





                                         Blood









   



                 Performing Organization     Address         City/[facility]     Phone Number

 

   



                 [place] [phone] [place] [address]   





* Magnesium (2018  2:12 PM CDT)



Only the most recent of 2 results within the time period is included.





   



                 Component       Value           Ref Range       Performed At

 

   



                 Magnesium       2.2             1.6 - 2.6 mg/dL     [facility]













                                         Specimen

 





                                         Blood









   



                 Performing Organization     Address         [facility]     Phone Number

 

   



                 [place] [address]     [phone] [address]   





* Basic Metabolic Panel (2018  2:12 PM CDT)



Only the most recent of 4 results within the time period is included.





   



                 Component       Value           Ref Range       Performed At

 

   



                 Sodium          142             136 - 145 meq/L     [facility]

 

   



                 Potassium       3.9             3.5 - 5.1 meq/L     [facility]

 

   



                 Chloride        107             98 - 107 meq/L     [facility]

 

   



                 CO2             27              22 - 29 meq/L     [facility]

 

   



                 BUN             13              7 - 21 mg/dL     [facility]

 

   



                 Creatinine      0.85            0.57 - 1.25 mg/dL     [facility]

 

   



                 Glucose         88              70 - 105 mg/dL     [facility]

 

   



                 Calcium         9.9             8.4 - 10.2 mg/dL     [facility]

 

   



                 EGFR            81Comment: ESTIMATED GFR IS     mL/min/1.73 sq m     [facility]



                           NOT AS ACCURATE AS CREATININE      [facility]



                                         CLEARANCE IN PREDICTING  



                                         GLOMERULAR FILTRATION RATE.  



                                         ESTIMATED GFR IS NOT  



                                         APPLICABLE FOR DIALYSIS  



                                         PATIENTS.  













                                         Specimen

 





                                         Blood









   



                 Performing Organization     Address         City/State/Zipcode     Phone Number

 

   



                 [facility]     [address] [place] [address] [phone]





                                         MEDICAL [facility]   





* XR chest 1 view portable / bedside (2018  2:06 PM CDT)



Only the most recent of 2 results within the time period is included.









                                         Specimen

 











 



                           Narrative                 Performed At

 

 



                           FINAL REPORT              [facility]



                                         PATIENT ID: [id number] 



                                         EXAM: Frontal chest radiograph 



                                         HISTORY PROVIDED: Leg swelling 



                                         COMPARISON: 2018 



                                         IMPRESSION: 



                                         The lungs are clear without focal consolidation. There may be a trace 



                                         left pleural effusion given minimal blunting of the left costophrenic 



                                         angle. No discernible pneumothorax. 



                                         The cardiomediastinal silhouette is within normal limits. The 



                                         thoracic aorta is tortuous. 



                                         No acute osseous abnormality. 



                                         Signed: Cole Cueto MD 



                                         Report Verified Date/Time:2018 14:54:03 



                                         Reading Location: [facility] Mammo Reading Room 



                                         Electronically signed by: COLE CUETO on 2018 02:54 PM

 









                                        Procedure Note

 

                                        



Interface, External Ris In - 2018  2:56 PM CDT



FINAL REPORT

 

PATIENT ID:   [id number]

 

EXAM: Frontal chest radiograph

 

HISTORY PROVIDED: Leg swelling

 

COMPARISON: 2018

 

IMPRESSION:

The lungs are clear without focal consolidation. There may be a trace 

left pleural effusion given minimal blunting of the left costophrenic 

angle. No discernible pneumothorax.

 

The cardiomediastinal silhouette is within normal limits. The 

thoracic aorta is tortuous.

 

No acute osseous abnormality.

 

Signed: Cole Cueto MD

Report Verified Date/Time:  2018 14:54:03

 

[facility]

      Electronically signed by: COLE CUETO on 2018 02:54 PM

 









   



                 Performing Organization     Address         City/State/Zipcode     Phone Number

 

   



                                         [facility]   





* RHYTHM STRIP - SCAN (2018  3:31 PM CDT)





 



                           Narrative                 Performed At

 

 



                                         This result has an attachment that is not available. 





* EKG-SCANNED (2018  3:30 PM CDT)





 



                           Narrative                 Performed At

 

 



                                         This result has an attachment that is not available. 





* MR brain with IV contrast (2018 12:09 PM CDT)









                                         Specimen

 











 



                           Narrative                 Performed At

 

 



                           FINAL REPORT              [facility]



                                         PATIENT ID: [id number] 



                                         MRI brain and orbits with and without contrast 



                                         Comparison:CTA 2018, MRI September 10 



                                         Reason for exam: Multiple sclerosis 



                                         Discussion: T1 precontrast and multiplanar T1 postcontrast MR imaging 



                                         of the brain was provided. Reconstructed postcontrast renderings were 



                                         provided. Dedicated axial and coronal pre and postcontrast orbital 



                                         imaging was performed as well using T1 and T2 sequences with 



                                         appropriate use of fat saturation. 



                                         Postcontrast imaging is motion degraded. The study is therefore 



                                         limited for detecting subtle enhancing foci. No grossly apparent 



                                         abnormal cerebral enhancement is identified. 



                                         Orbital imaging revealed no convincing abnormality. Prominent 



                                         superior ophthalmic veins are thought to be a normal variant. The 



                                         globes, optic nerve/sheath complexes, intraconal regions, extraocular 



                                         musculature, extraconal regions, cavernous sinuses, optic chiasm, and 



                                         parasellar regions are within normal limits. 



                                         Impressions: 



                                         1. Motion degraded postcontrast brain evaluation albeit with no 



                                         grossly apparent pathologic enhancement. Consider follow-up imaging 



                                         as clinically warranted. 



                                         2. Unremarkable orbits. 



                                         Signed: Carlos Potter MD 



                                         Report Verified Date/Time:2018 14:05:18 



                                         Reading Location: [facility] Neuro Reading Room 



                                         Electronically signed by: CARLOS POTTER M.D. on 2018 02:05 PM

 









                                        Procedure Note

 

                                        



Interface, External Ris In - 2018  2:07 PM CDT



FINAL REPORT

 

PATIENT ID:   [id number]

 

 

MRI brain and orbits with and without contrast

 

Comparison:      CTA 2018, MRI September 10

 

Reason for exam: Multiple sclerosis

 

Discussion: T1 precontrast and multiplanar T1 postcontrast MR imaging 

of the brain was provided. Reconstructed postcontrast renderings were 

provided. Dedicated axial and coronal pre and postcontrast orbital 

imaging was performed as well using T1 and T2 sequences with 

appropriate use of fat saturation.

 

Postcontrast imaging is motion degraded. The study is therefore 

limited for detecting subtle enhancing foci. No grossly apparent 

abnormal cerebral enhancement is identified.

 

Orbital imaging revealed no convincing abnormality. Prominent 

superior ophthalmic veins are thought to be a normal variant. The 

globes, optic nerve/sheath complexes, intraconal regions, extraocular 

musculature, extraconal regions, cavernous sinuses, optic chiasm, and 

parasellar regions are within normal limits.

 

Impressions:

 

                                        1. Motion degraded postcontrast brain evaluation albeit with no 

grossly apparent pathologic enhancement. Consider follow-up imaging 

as clinically warranted.

 

 

 

                                        2. Unremarkable orbits.

 

Signed: Carlos Potter MD

Report Verified Date/Time:  2018 14:05:18

 

Reading Location: [address] Neuro Reading Room

      Electronically signed by: CARLOS POTTER M.D. on 2018 02:05 PM

 









   



                 Performing Organization     Address         City/State/Zipcode     Phone Number

 

   



                                         [facility]   





* MR orbit face neck without & with IV contrast (2018 12:09 PM CDT)









                                         Specimen

 











 



                           Narrative                 Performed At

 

 



                           FINAL REPORT               [employer] [facility]



                                         PATIENT ID: [id number] 



                                         MRI brain and orbits with and without contrast 



                                         Comparison:CTA 2018, MRI September 10 



                                         Reason for exam: Multiple sclerosis 



                                         Discussion: T1 precontrast and multiplanar T1 postcontrast MR imaging 



                                         of the brain was provided. Reconstructed postcontrast renderings were 



                                         provided. Dedicated axial and coronal pre and postcontrast orbital 



                                         imaging was performed as well using T1 and T2 sequences with 



                                         appropriate use of fat saturation. 



                                         Postcontrast imaging is motion degraded. The study is therefore 



                                         limited for detecting subtle enhancing foci. No grossly apparent 



                                         abnormal cerebral enhancement is identified. 



                                         Orbital imaging revealed no convincing abnormality. Prominent 



                                         superior ophthalmic veins are thought to be a normal variant. The 



                                         globes, optic nerve/sheath complexes, intraconal regions, extraocular 



                                         musculature, extraconal regions, cavernous sinuses, optic chiasm, and 



                                         parasellar regions are within normal limits. 



                                         Impressions: 



                                         1. Motion degraded postcontrast brain evaluation albeit with no 



                                         grossly apparent pathologic enhancement. Consider follow-up imaging 



                                         as clinically warranted. 



                                         2. Unremarkable orbits. 



                                         Signed: Carlos Potter MD 



                                         Report Verified Date/Time:2018 14:05:18 



                                         Reading Location: [address] Neuro Reading Room 



                                         Electronically signed by: CARLOS POTTER M.D. on 2018 02:05 PM

 









                                        Procedure Note

 

                                        



Interface, External Ris In - 2018  2:07 PM CDT



FINAL REPORT

 

PATIENT ID:   [id number]

 

 

MRI brain and orbits with and without contrast

 

Comparison:      CTA 2018, MRI September 10

 

Reason for exam: Multiple sclerosis

 

Discussion: T1 precontrast and multiplanar T1 postcontrast MR imaging 

of the brain was provided. Reconstructed postcontrast renderings were 

provided. Dedicated axial and coronal pre and postcontrast orbital 

imaging was performed as well using T1 and T2 sequences with 

appropriate use of fat saturation.

 

Postcontrast imaging is motion degraded. The study is therefore 

limited for detecting subtle enhancing foci. No grossly apparent 

abnormal cerebral enhancement is identified.

 

Orbital imaging revealed no convincing abnormality. Prominent 

superior ophthalmic veins are thought to be a normal variant. The 

globes, optic nerve/sheath complexes, intraconal regions, extraocular 

musculature, extraconal regions, cavernous sinuses, optic chiasm, and 

parasellar regions are within normal limits.

 

Impressions:

 

                                        1. Motion degraded postcontrast brain evaluation albeit with no 

grossly apparent pathologic enhancement. Consider follow-up imaging 

as clinically warranted.

 

 

 

                                        2. Unremarkable orbits.

 

Signed: Carlos Potter MD

Report Verified Date/Time:  2018 14:05:18

 

Reading Location: [address] Neuro Reading Room

      Electronically signed by: CARLOS POTTER M.D. on 2018 02:05 PM

 









   



                 Performing Organization     Address         City/State/Zipcode     Phone Number

 

   



                                         [facility]   





* MR brain without IV contrast (09/10/2018  4:34 PM CDT)









                                         Specimen

 











 



                           Narrative                 Performed At

 

 



                           FINAL REPORT              [facility]



                                         PATIENT ID: [id number] 



                                         MRI Brain without contrast 



                                         Clinical History: encephalopathy 



                                         Technique: MRI of the brain utilizing axial T2, FLAIR, GRE, DWI; 



                                         sagittal and coronal T1-weighted images. 



                                         Comparisons: CT 2018 



                                         Findings: 



                                         There is no evidence of acute infarct or hemorrhage. There is mild to 



                                         moderate periventricular and subcortical white matter T2 



                                         hyperintensity, which is nonspecific but compatible with chronic 



                                         microvascular ischemic change. There is mild generalized parenchymal 



                                         volume loss without hydrocephalus, midline shift, or apparent mass 



                                         effect. There are no extra-axial fluid collections. The 



                                         craniocervical junction is preserved. The major intracranial 



                                         flow-voids appear patent. 



                                         IMPRESSION: 



                                         No evidence of acute infarct, hemorrhage, or hydrocephalus. 



                                         Chronic microvascular ischemic disease with generalized parenchymal 



                                         volume loss. 



                                         Signed: Choco Henry MD 



                                         Report Verified Date/Time:09/10/2018 17:48:49 



                                         Reading Location: [facility] Reading Room 



                                         Electronically signed by: CHOCO HENRY M.D. on 09/10/2018 05:48 





                                         PM 









                                        Procedure Note

 

                                        



Interface, External Ris In - 09/10/2018  5:51 PM CDT



FINAL REPORT

 

PATIENT ID:   [id number]

 

MRI Brain without contrast

 

Clinical History: encephalopathy

 

Technique: MRI of the brain utilizing axial T2, FLAIR, GRE, DWI; 

sagittal and coronal T1-weighted images.

 

Comparisons: CT 2018

 

Findings:

 

There is no evidence of acute infarct or hemorrhage. There is mild to 

moderate periventricular and subcortical white matter T2 

hyperintensity, which is nonspecific but compatible with chronic 

microvascular ischemic change. There is mild generalized parenchymal 

volume loss without hydrocephalus, midline shift, or apparent mass 

effect. There are no extra-axial fluid collections. The 

craniocervical junction is preserved. The major intracranial 

flow-voids appear patent. 

 

IMPRESSION:

 

No evidence of acute infarct, hemorrhage, or hydrocephalus.

 

Chronic microvascular ischemic disease with generalized parenchymal 

volume loss.

 

Signed: Choco Henry MD

Report Verified Date/Time:  09/10/2018 17:48:49

 

Reading Location: [facility] Reading Room

      Electronically signed by: CHOCO HENRY M.D. on 09/10/2018 05:48 PM

 









   



                 Performing Organization     Address         City/State/Zipcode     Phone Number

 

   



                                         [facility]   





* NM parathyroid scan with tomographic SPECT (09/10/2018  3:49 PM CDT)









                                         Specimen

 











 



                           Narrative                 Performed At

 

 



                           FINAL REPORT              [facility]



                                         PATIENT ID: [id number] 



                                         PROCEDURE: PARATHYROID SCAN, with SPECT 



                                         CPT CODE:10280 



                                         INDICATION:History of primary hyperthyroidism, recent admission 



                                         for uncontrollable hypertension 



                                         PROTOCOL:25.3 mCi of Tc-99m sestamibi was injected 



                                         intravenously.Planar imaging of the head/neck and chest was 



                                         performed shortly after tracer injection and was repeated 



                                         approximately 3 hours later. Limited SPECT images were obtained for 



                                         tracer localization. 



                                         FINDINGS: Early images show tracer uptake in the salivary 



                                         glands, the thyroid gland, and the heart. Delayed images show 



                                         substantial washout of tracer from the thyroid gland. No focal 



                                         abnormality is noted in the thyroid bed or mediastinum. 



                                         IMPRESSION:Normal radionuclide parathyroid scan. 



                                         There is no focal accumulation to indicate parathyroid adenoma. 



                                         Signed: Apolinar Koroma MD 



                                         Report Verified Date/Time:09/10/2018 16:44:43 



                                         Reading Location: [address] Reading Room 



                                         Electronically signed by: APOLINAR KOROMA MD on 09/10/2018 04:44 PM

 









                                        Procedure Note

 

                                        



Interface, External Ris In - 09/10/2018  4:46 PM CDT



FINAL REPORT

 

PATIENT ID:   [id number]

 

 

PROCEDURE:     PARATHYROID SCAN, with SPECT

 

CPT CODE:        01759

 

INDICATION:      History of primary hyperthyroidism, recent admission 

for uncontrollable hypertension

 

PROTOCOL:      25.3 mCi of Tc-99m sestamibi was injected 

intravenously.  Planar imaging of the head/neck and chest was 

performed shortly after tracer injection and was repeated 

approximately 3 hours later. Limited SPECT images were obtained for 

tracer localization.

 

FINDINGS:         Early images show tracer uptake in the salivary 

glands, the thyroid gland, and the heart. Delayed images show 

substantial washout of tracer from the thyroid gland. No focal 

abnormality is noted in the thyroid bed or mediastinum.

 

IMPRESSION:    Normal radionuclide parathyroid scan. 

 

There is no focal accumulation to indicate parathyroid adenoma.

 

Signed: Apolinar Koroma MD

Report Verified Date/Time:  09/10/2018 16:44:43

 

Reading Location: [address] Reading Room

    Electronically signed by: APOLINAR KOROMA MD on 09/10/2018 04:44 PM

 









   



                 Performing Organization     Address         City/State/Zipcode     Phone Number

 

   



                                          [place]   





* Calcium, 24 hour urine (2018 10:28 PM CDT)





   



                 Component       Value           Ref Range       Performed At

 

   



                 Volume, Urine     2,550           ml              [facility]

 

   



                 Calcium, Ur     <2.0            mg/dL           [facility]

 

   



                 Calcium, 24hr Urine     <51             50 - 300 mg/24 Hr     [facility]













                                         Specimen

 





                                         Urine









   



                 Performing Organization     Address         [facility]     Phone Number

 

   



                 [facility]     [address] [place] [address]     [phone]





                                         [facility]   





* ECHOCARDIOGRAM REPORT - SCAN (2018 12:20 PM CDT)





 



                           Narrative                 Performed At

 

 



                                         This result has an attachment that is not available. 





* 2D Echo W/Doppler(CW/PW/Color) (2018  9:26 AM CDT)





   



                 Component       Value           Ref Range       Performed At

 

   



                           Ejection Fraction         [facility]



                                         [facility]













                                         Specimen

 











 



                           Narrative                 Performed At

 

 



                           Transthoracic Echocardiography Report (TTE)     [facility]



                           Demographics              [facility]



                                         Patient Name JORGITO CORTEZ Date of Study 2018 



                                         SANCHEZ 



                                         [id number] 



                                         GenderFemale 



                                         Visit Number [id number] 



                                         RaceBlack 



                                         [id number] Number [id number] 





                                         Number 



                                         Date of Birth1950 Referring Physician Bandar Escalante 



                                         Age66 year(s) Sonographer 



                                         Angelica ZAMUDIO [facility] 



                                          



                                         InterpretingBreanne Hoffman MD. 



                                          Physician 



                                         Procedure 



                                         Type of 



                                         Study TTE procedure:2DECHO W DOPPLER(CW/PW/COLOR) (Routine) 



                                         Indications:Dyspnea/SOB. 



                                         Clinical History 



                                         ACID REFLUX, ANXIETY, ASTHMA, ARTHIRITIS, CVA, MS, HTN. 



                                         Height: 64 inches Weight: 92.53 kg (204 lbs) BSA: 1.97 m^2 BMI: 35.02 kg/m^2 



                                         HR: 63 bpm BP: 150/77 mmHg 



                                         Summary 



                                         Normal left ventricular chamber size. Normal wall thickness. Normal 



                                         overall left ventricular systolic function. No apparent segmental wall 



                                         motion abnormalities. Estimated LVEF is 55-60%. 



                                         No evidence of pericardial effusion. 



                                         Signature 



                                         ---------------------------------------------------------------- 



                                         Electronically signed by Breanne Hoffman MD.(Interpreting 



                                         physician) on 2018 11:48 AM 



                                         ---------------------------------------------------------------- 



                                         Findings 



                                         LeftNormal left ventricular chamber size. Normal wall 



                                         thickness. 



                                         Ventricle Normal overall left ventricular systolic function. No 



                                          apparent segmental wall motion abnormalities. 



                                         Estimated LVEF 



                                          is 55-60%. 



                                         Left Atrium Normal size left atrium. 



                                         Right Normal right ventricle structure and function. 



                                         Ventricle 



                                         Right AtriumNormal right atrium. 



                                         Aortic ValveNormal aortic valve structure and function. 



                                         Mitral ValveNormal mitral valve structure and function. 



                                         Tricuspid Normal tricuspid valve structure and function. 



                                         Valve 



                                         PulmonicNormal pulmonic valve structure and function. 



                                         Valve 



                                         Pericardium No evidence of pericardial effusion. 



                                         Chambers/Structures 



                                         Left Atrium 



                                         LA Dimension: 3.19 cm LA Area: 15.9 cm^2 



                                         LA Volume: 44.53 ml 



                                         LA Vol. Index: 23 ml/m^2 



                                         Left Ventricle 



                                         LVIDd: 3.13 



                                         cmLVEDV:38.7

 



                                         5 ml 



                                         LVIDs: 2.14 



                                         cmLVESV:15.0

 



                                         8 ml 



                                         LV Septum Diastolic: 1.43 cm 



                                         LV Septum Systolic: 1.8 cmLV 



                                         Length: 7.43 cm 



                                         LV PW Diastolic: 1.1 cm LV FS: 





                                         31.6 % 



                                         LV PW Systolic: 1.54 cm 



                                         LVOT Diameter: 1.86 cm 



                                         LVEF: 61.1 % 



                                         Right Atrium 



                                         RA Systolic Pressure: 10 mmHg 



                                         Right Ventricle 



                                         RVOT VTI: 13.78 cm RV Systolic Pressure: 29 mmHg 



                                         Aorta 



                                         Ao Root S of Jen.: 2.78 cm 



                                         Doppler/Quantitative Measurements 



                                         Mitral Valve 



                                         MV Peak E-Wave: 0.61 m/sMV Peak A-Wave: 0.8 m/s 



                                         P1/2t: 71 msecE/A Ratio: 0.76 



                                         Peak Velocity: 0.96 m/s Peak Gradient: 1.48 mmHg 



                                         Mean Velocity: 0.51 m/s Deceleration Time: 280.1 



                                         msec 



                                         Mean Gradient: 1.24 mmHgArea (continuity): 1.9 cm^2

 



                                         MV Area (PHT): 3.1 cm^2 



                                          MV

 



                                         VTI: 32.29 cm 



                                         MV Primitivo. Peak: 



                                         Tissue Doppler 



                                         E' Septal Velocity: 0.06 m/s 



                                         E' Lateral Velocity: 0.08 m/s 



                                         Aortic Valve 



                                         Peak Velocity: 1.24 m/sMean Velocity: 0.88 





                                         m/s 



                                         Peak Gradient: 6.16 mmHg Mean Gradient: 3.47 



                                         mmHg 



                                         AV Area (continuity): 2.28 cm^2 



                                         AV VTI: 26.94 cm 



                                         Cusp Separation: 1.82 cm 



                                         AV DVI: 0.84 



                                         LVOT 



                                         Peak Velocity: 1.1 m/sPeak Gradient: 4.87 mmHg 



                                         Mean Velocity: 0.74 m/s Mean Gradient: 2.49 mmHg 



                                         LVOT Diameter: 1.86 cmLVOT VTI: 22.59 cm 



                                         LVOT Area: 2.72 cm^2LVOT SV:61.35 ml 



                                         LVOT CO: 3.87 l/min LVOT CI: 1.96 l/min/m^2 



                                         Tricuspid Valve 



                                         Estimated RVSP: 29 mmHg 



                                         Estimated RAP: 10 mmHg 



                                         TR Velocity: 2.18 m/s 



                                         TR Gradient: 19 mmHg 



                                         Pulmonic Valve 



                                         Peak Velocity: 0.81 m/s Peak Gradient: 2.6 mmHg 



                                         Mean Velocity: 0.64 m/s Mean Gradient: 1.66 mmHg 



                                          



                                         Estimated PASP: 29 mmHg 









                                        Procedure Note

 

                                        



Interface, External Ris In - 2018 11:51 AM CDT



Transthoracic Echocardiography Report (TTE)



 Demographics

 

 Patient Name   JORGITO CORTEZ   Date of Study       2018

                SANCHEZ

 

 MRN            [id number]         Gender              Female

 

 Visit Number   [id number]       Race                Black

 

 Accession      [id number]        Room Number         A506

 Number

 

 Date of Birth  1950       Referring Physician Bandar Escalante

 

 Age            67 year(s)       [facility]         [facility]

 

                                 Interpreting        Breanne Hoffman MD.

                                 Physician

 

Procedure



 Type of

 Study     TTE procedure:2DECHO W DOPPLER(CW/PW/COLOR) (Routine)

 

Indications:Dyspnea/SOB.



Clinical History

ACID REFLUX, ANXIETY, ASTHMA, ARTHIRITIS, CVA, MS, HTN.



Height: 64 inches Weight: 92.53 kg (204 lbs) BSA: 1.97 m^2 BMI: 35.02 kg/m^2



HR: 63 bpm BP: 150/77 mmHg



 Summary

 Normal left ventricular chamber size. Normal wall thickness. Normal

 overall left ventricular systolic function. No apparent segmental wall

 motion abnormalities. Estimated LVEF is 55-60%.

 No evidence of pericardial effusion.

 

 Signature

 

 ----------------------------------------------------------------

 Electronically signed by Breanne Hoffman MD.(Interpreting

 physician) on 2018 11:48 AM

 ----------------------------------------------------------------

 

 Findings

 

 Left          Normal left ventricular chamber size. Normal wall thickness.

 Ventricle     Normal overall left ventricular systolic function. No

               apparent segmental wall motion abnormalities. Estimated LVEF

               is 55-60%.

 

 Left Atrium   Normal size left atrium.

 

 Right         Normal right ventricle structure and function.

 Ventricle

 

 Right Atrium  Normal right atrium.

 

 Aortic Valve  Normal aortic valve structure and function.

 

 Mitral Valve  Normal mitral valve structure and function.

 

 Tricuspid     Normal tricuspid valve structure and function.

 Valve

 

 Pulmonic      Normal pulmonic valve structure and function.

 Valve

 

 Pericardium   No evidence of pericardial effusion.

 

Chambers/Structures



 Left Atrium

 

 LA Dimension: 3.19 cm                   LA Area: 15.9 cm^2

 LA Volume: 44.53 ml

 LA Vol. Index: 23 ml/m^2

 

 Left Ventricle

 

 LVIDd: 3.13 cm                                  LVEDV:38.75 ml

 LVIDs: 2.14 cm                                  LVESV:15.08 ml

 LV Septum Diastolic: 1.43 cm

 LV Septum Systolic: 1.8 cm                      LV Length: 7.43 cm

 LV PW Diastolic: 1.1 cm                         LV FS: 31.6 %

 LV PW Systolic: 1.54 cm

 

 LVOT Diameter: 1.86 cm

 

 LVEF: 61.1 %

 

 Right Atrium

 

 RA Systolic Pressure: 10 mmHg

 

 Right Ventricle

 

 RVOT VTI: 13.78 cm           RV Systolic Pressure: 29 mmHg

 

Aorta

 

 Ao Root S of Jen.: 2.78 cm

 

Doppler/Quantitative Measurements



 Mitral Valve

 

 MV Peak E-Wave: 0.61 m/s              MV Peak A-Wave: 0.8 m/s

 P1/2t: 71 msec                        E/A Ratio: 0.76

 Peak Velocity: 0.96 m/s               Peak Gradient: 1.48 mmHg

 Mean Velocity: 0.51 m/s               Deceleration Time: 280.1 msec

 Mean Gradient: 1.24 mmHg              Area (continuity): 1.9 cm^2

 MV Area (PHT): 3.1 cm^2

 

                                       MV VTI: 32.29 cm

 

 MV Primitivo. Peak:

 

 Tissue Doppler

 

 E' Septal Velocity: 0.06 m/s

 E' Lateral Velocity: 0.08 m/s

 

 Aortic Valve

 

 Peak Velocity: 1.24 m/s                  Mean Velocity: 0.88 m/s

 Peak Gradient: 6.16 mmHg                 Mean Gradient: 3.47 mmHg

 AV Area (continuity): 2.28 cm^2

 AV VTI: 26.94 cm

 

 Cusp Separation: 1.82 cm

 AV DVI: 0.84

 

 LVOT

 

 Peak Velocity: 1.1 m/s              Peak Gradient: 4.87 mmHg

 Mean Velocity: 0.74 m/s             Mean Gradient: 2.49 mmHg

 LVOT Diameter: 1.86 cm              LVOT VTI: 22.59 cm

 LVOT Area: 2.72 cm^2                LVOT SV:61.35 ml

 LVOT CO: 3.87 l/min                 LVOT CI: 1.96 l/min/m^2

 

 Tricuspid Valve

 

 Estimated RVSP: 29 mmHg

 Estimated RAP: 10 mmHg

 TR Velocity: 2.18 m/s

 TR Gradient: 19 mmHg

 

 Pulmonic Valve

 

 Peak Velocity: 0.81 m/s             Peak Gradient: 2.6 mmHg

 Mean Velocity: 0.64 m/s             Mean Gradient: 1.66 mmHg

                                     Estimated PASP: 29 mmHg

 









   



                 Performing Organization     Address         City[facility]     Phone Number

 

   



                                         [facility]   



                                         [facility]   





* Calcium, Ionized (2018  3:46 AM CDT)





   



                 Component       Value           Ref Range       Performed At

 

   



                 Calcium, Ion     1.11 (L)        1.12 - 1.27 mmol/L     [facility]



                                         LABORATORY

 

   



                     pH, Blood           7.31                [facility]



                                         [facility]













                                         Specimen

 





                                         Blood









   



                 Performing Organization     Address         City/[facility]     Phone Number

 

   



                 [facility]     [address] [place] [address]     [phone]







* Vitamin D, 25-Hydroxy (2018  3:46 AM CDT)





   



                 Component       Value           Ref Range       Performed At

 

   



                 Vitamin D 25-Hydroxy     13.0            6.6 - 49.9 ng/mL     [facility]













                                         Specimen

 





                                         Blood









 



                           Narrative                 Performed At

 

 



                           Effective 10/11/2017: Reference Range Change     [facility]



                           New: 6.6-49.9 ng/mL Previous: 13.0-47.8 ng/mL     [facility]



                                         Recommended Vitamin D Target Range: 30.0-40.0 ng/mL 









   



                 Performing Organization     Address         City[facility]     Phone Number

 

   



                 [facility]     [address] [place] [address] [phone]





                                         MEDICAL CENTER   





* Comprehensive metabolic panel (2018  3:46 AM CDT)





   



                 Component       Value           Ref Range       Performed At

 

   



                 Protein, Total     7.4             6.0 - 8.5 gm/dL     [facility]



                                         [facility]

 

   



                 Albumin         4.0             3.5 - 5.0 g/dL     [facility]



                                         [facility]

 

   



                 Alkaline Phosphatase     68              30 - 115 U/L     [facility]



                                         LABORATORY

 

   



                 Total Bilirubin     0.5             0.1 - 1.2 mg/dL     [facility]



                                         [facility]

 

   



                 Sodium          140             135 - 148 meq/L     [facility]



                                         [facility]

 

   



                 Potassium       3.2 (L)         3.6 - 5.5 meq/L     [facility]



                                         [facility]

 

   



                 Chloride        103             98 - 106 meq/L     [facility]



                                         LABORATORY

 

   



                 CO2             28              20 - 29 meq/L     [facility]



                                         [facility]

 

   



                 BUN             13              10 - 26 mg/dL     [facility]



                                         [facility]

 

   



                 Creatinine      1.05            0.50 - 1.20 mg/dL     [facility]



                                         [facility]

 

   



                 Glucose         71              70 - 110 mg/dL     [facility]



                                         [facility]

 

   



                 Calcium         9.3             8.5 - 10.5 mg/dL     [facility]



                                         [facility]

 

   



                 AST             16              5 - 40 U/L      [facility]



                                         LABORATORY

 

   



                 ALT             11              5 - 50 U/L      [facility]



                                         LABORATORY

 

   



                 EGFR            63Comment: ESTIMATED GFR IS     mL/min/1.73 sq m     [facility]



                           NOT AS ACCURATE AS CREATININE      LABORATORY



                                         CLEARANCE IN PREDICTING  



                                         GLOMERULAR FILTRATION RATE.  



                                         ESTIMATED GFR IS NOT  



                                         APPLICABLE FOR DIALYSIS  



                                         PATIENTS.  













                                         Specimen

 





                                         Blood









   



                 Performing Organization     Address         City[facility]     Phone Number

 

   



                 [facility]     [address] [place] [address]     [phone]







* TSH/Free T4 If Indicated (2018  5:16 PM CDT)





   



                 Component       Value           Ref Range       Performed At

 

   



                 TSH             1.65            0.35 - 5.50 uIU/mL     [facility]



                                         [facility]













                                         Specimen

 





                                         Blood









   



                 Performing Organization     Address         City/[facility]     Phone Number

 

   



                 [facility]     [address] [place] [address] [id number] [phone]







* PTH, intact (2018  5:16 PM CDT)





   



                 Component       Value           Ref Range       Performed At

 

   



                 PTH             94.3 (H)        15.0 - 90.0 pg/mL     [facility]



                                         [facility]













                                         Specimen

 





                                         Blood









   



                 Performing Organization     Address         [facility]     Phone Number

 

   



                 [facility]     [address] [place] [address] [id number] [phone]







* Hemoglobin A1c (2018  4:07 AM CDT)





   



                 Component       Value           Ref Range       Performed At

 

   



                 Hemoglobin A1C     5.4             4.3 - 6.1 %     [facility]



                                         [facility]













                                         Specimen

 





                                         Blood









   



                 Performing Organization     Address         [facility]     Phone Number

 

   



                 [facility]     [address] [place] [address]     [phone]







* Lipid panel (2018  4:07 AM CDT)





   



                 Component       Value           Ref Range       Performed At

 

   



                 Triglycerides     53Comment: Specimen markedly     mg/dL           [facility]



                           hemoly[facility]                 [facility]ATORY

 

   



                 Cholesterol     156Comment: Specimen markedly     mg/dL           [facility]



                           [facility]                 [facility]

 

   



                 HDL             61              mg/dL           [facility]



                                         [facility]

 

   



                 LDL Calculated     84              mg/dL           [facility]



                                         [facility]













                                         Specimen

 





                                         Blood









 



                           Narrative                 Performed At

 

 



                           Triglyceride Reference Range:     [facility]



                            Low Risk <150     LABORATORY



                                          [id number] 



                                          High Risk 200-499 



                                          Very High Risk>=500 



                                         Cholesterol Reference Range: 



                                          Low Risk <200 



                                          [id number] 



                                          High Risk>240 



                                         HDL Cholesterol Reference Range: 



                                          Low Risk >=60 



                                          High Risk <40 



                                         LDL Cholesterol Reference Range: 



                                          Optimal<100 



                                          Near [id number] 



                                          [id number] 



                                          [id number] 



                                          Very High >=190 









   



                 Performing Organization     Address         City/State/Zipcode     Phone Number

 

   



                 [facility]     [address] [place] [address]     [phone]







* CTA brain (2018 10:15 PM CDT)









                                         Specimen

 











 



                           Narrative                 Performed At

 

 



                           FINAL REPORT              [facility]



                                         PATIENT ID: [id number] 



                                         CT, CTANGIOBRAIN 



                                         BRAIN CT WITHOUT CONTRAST 



                                         INDICATION: headache, hypertension, dizzy 



                                         COMPARISON: CT head of the same date 



                                         TECHNIQUE: 



                                         Rapid acquisition spiral images were obtained between the skull base 



                                         and the cranial vertex during intravenous contrast infusion to 



                                         reconstruct axial images and angiographic 3D maximum intensity 



                                         projections (MIP). 3-D volumetric reformatted images were created at 



                                         a dedicated workstation. Precontrast images of the brain were also 



                                         obtained. 



                                         DOSE REDUCTION: Dose modulation, iterative reconstruction, and/or 



                                         weight-based adjustment of the mA/kV was utilized to reduce the 



                                         radiation dose to as low as reasonably achievable. 



                                         FINDINGS: 



                                         NECT BRAIN: 



                                         Cerebral parenchyma: Patchy low attenuation noted in the 



                                         parieto-occipital regions bilaterally as well as small focus in the 



                                         left frontal pole. No disruption of the gray-white distinction. No 



                                         hemorrhage. 



                                         Midline structures: Normally positioned. 



                                         Cerebellum and brainstem: Normal. 



                                         Ventricles: Normal volume. 



                                         Extra-axial spaces: Unremarkable. 



                                         Calvarium and skull base: Intact. 



                                         Paranasal sinuses and mastoid air cells: Visible chambers are clear. 



                                         Orbital contents: Included portions unremarkable. 



                                         CTA BRAIN: 



                                         Internal carotid arteries: Petrous, cavernous and supraclinoid 



                                         portions patent. 



                                         Middle cerebral arteries: Patent to distal branches. 



                                         Anterior cerebral arteries: Patent. Intact anterior communicating 



                                         artery. 



                                         Basilar system: Patent vertebrobasilar system. 



                                         Posterior cerebral arteries:Patent beyond the quadrigeminal segments. 



                                         Venous opacification: Major dural sinuses unremarkable for bolus 



                                         timing. 



                                         Additional findings: None. 



                                         IMPRESSION: 



                                         Parenchymal appearance suggests hypertensive (reversible) 



                                         encephalopathy syndrome. 



                                         Unremarkable CTA of the head. 



                                         Signed: JR Sandoval Robert MD 



                                         Report Verified Date/Time:2018 22:54:58 



                                         [place] Location: [address] Body [place] 



                                         Electronically signed by: TEENA SANDOVAL on 2018 10:54 PM

 









                                        Procedure Note

 

                                        



Interface, External Ris In - 2018 10:57 PM CDT



FINAL REPORT

 

PATIENT ID:   [id number]

 

CT, CTANGIO  BRAIN

BRAIN CT WITHOUT CONTRAST

 

INDICATION: headache, hypertension, dizzy

 

COMPARISON: CT head of the same date

 

TECHNIQUE:

Rapid acquisition spiral images were obtained between the skull base 

and the cranial vertex during intravenous contrast infusion to 

reconstruct axial images and angiographic 3D maximum intensity 

projections (MIP). 3-D volumetric reformatted images were created at 

a dedicated workstation. Precontrast images of the brain were also 

obtained. 

 

DOSE REDUCTION: Dose modulation, iterative reconstruction, and/or 

weight-based adjustment of the mA/kV was utilized to reduce the 

radiation dose to as low as reasonably achievable.

 

FINDINGS:

NECT BRAIN:

Cerebral parenchyma: Patchy low attenuation noted in the 

parieto-occipital regions bilaterally as well as small focus in the 

left frontal pole. No disruption of the gray-white distinction. No 

hemorrhage.

Midline structures: Normally positioned.

Cerebellum and brainstem: Normal.

Ventricles: Normal volume.

Extra-axial spaces: Unremarkable.

Calvarium and skull base: Intact.

Paranasal sinuses and mastoid air cells: Visible chambers are clear.

Orbital contents: Included portions unremarkable.

 

CTA BRAIN:

Internal carotid arteries: Petrous, cavernous and supraclinoid 

portions patent. 

Middle cerebral arteries: Patent to distal branches.

Anterior cerebral arteries: Patent. Intact anterior communicating 

artery.

Basilar system: Patent vertebrobasilar system.

Posterior cerebral arteries:Patent beyond the quadrigeminal segments.

Venous opacification: Major dural sinuses unremarkable for bolus 

timing.

Additional findings: None.

 

 

IMPRESSION:

 

Parenchymal appearance suggests hypertensive (reversible) 

encephalopathy syndrome.

 

Unremarkable CTA of the head.

 

Signed: JR Sandoval Robert MD

Report Verified Date/Time:  2018 22:54:58

 

[place] Location: [facility] B1 C013Y CT Body [place]

    Electronically signed by: TEENA SANDOVAL on 2018 10:54 PM

 









   



                 Performing Organization     Address         City/State/Zipcode     Phone Number

 

   



                                         [facility]   





after 2018



Insurance







     



            Payer      Benefit     Subscriber ID     Type       Phone      Address



                                         Plan /    



                                         Group    

 

     



                     [facility]              xxxxxxxxx   



                           MEDICARE [facility] CARE         MEDICARE    



                                         HMO    

 

     



                 MEDICAID        MEDICAID        xxxxxxxxx       [place]    









     



            Guarantor Name     Account     Relation to     Date of     Phone      Billing Address



                     Type                Patient             Birth  

 

     



            Jorgito Cortez     Personal/F     Self       1950     [phone]     [address]



                     amily               (Home)              [address]



                                         [place] [address]







Advance Directives





Patient has advance care planning documents, and code status on file. For more i
nformation, please contact:



[facility]



[address] [place] [address] [phone]









                          Date Inactivated          Comments



                           Code Status               Date Activated  

 

                          2018  6:15 PM         



                           Full Code                 2018  5:40 AM  









  



                           This code status was determined by:     Patient 









                                                     

 

                          2015 10:21 PM        



                           Full Code                 2015  5:37 AM  









  



                           This code status was determined by:     Patient